# Patient Record
Sex: FEMALE | Race: WHITE | NOT HISPANIC OR LATINO | Employment: FULL TIME | URBAN - NONMETROPOLITAN AREA
[De-identification: names, ages, dates, MRNs, and addresses within clinical notes are randomized per-mention and may not be internally consistent; named-entity substitution may affect disease eponyms.]

---

## 2019-09-17 ENCOUNTER — CONSULT (OUTPATIENT)
Dept: ONCOLOGY | Facility: CLINIC | Age: 49
End: 2019-09-17

## 2019-09-17 VITALS
DIASTOLIC BLOOD PRESSURE: 82 MMHG | RESPIRATION RATE: 18 BRPM | HEART RATE: 88 BPM | HEIGHT: 61 IN | SYSTOLIC BLOOD PRESSURE: 125 MMHG | OXYGEN SATURATION: 98 % | TEMPERATURE: 97.6 F | BODY MASS INDEX: 46.24 KG/M2 | WEIGHT: 244.9 LBS

## 2019-09-17 DIAGNOSIS — D64.9 ANEMIA, UNSPECIFIED TYPE: Primary | ICD-10-CM

## 2019-09-17 DIAGNOSIS — K90.9 MALABSORPTION OF IRON: ICD-10-CM

## 2019-09-17 DIAGNOSIS — D75.839 THROMBOCYTOSIS: ICD-10-CM

## 2019-09-17 DIAGNOSIS — E53.8 B12 DEFICIENCY: ICD-10-CM

## 2019-09-17 DIAGNOSIS — D50.9 IRON DEFICIENCY ANEMIA, UNSPECIFIED IRON DEFICIENCY ANEMIA TYPE: ICD-10-CM

## 2019-09-17 LAB
ALBUMIN SERPL-MCNC: 4.21 G/DL (ref 3.5–5.2)
ALBUMIN/GLOB SERPL: 1.4 G/DL
ALP SERPL-CCNC: 63 U/L (ref 39–117)
ALT SERPL W P-5'-P-CCNC: 17 U/L (ref 1–33)
ANION GAP SERPL CALCULATED.3IONS-SCNC: 14 MMOL/L (ref 5–15)
ANISOCYTOSIS BLD QL: NORMAL
AST SERPL-CCNC: 20 U/L (ref 1–32)
BASOPHILS # BLD AUTO: 0.04 10*3/MM3 (ref 0–0.2)
BASOPHILS NFR BLD AUTO: 0.4 % (ref 0–1.5)
BILIRUB SERPL-MCNC: 0.3 MG/DL (ref 0.2–1.2)
BUN BLD-MCNC: 11 MG/DL (ref 6–20)
BUN/CREAT SERPL: 20.8 (ref 7–25)
CALCIUM SPEC-SCNC: 9.3 MG/DL (ref 8.6–10.5)
CHLORIDE SERPL-SCNC: 103 MMOL/L (ref 98–107)
CO2 SERPL-SCNC: 24 MMOL/L (ref 22–29)
CREAT BLD-MCNC: 0.53 MG/DL (ref 0.57–1)
CRP SERPL-MCNC: 0.11 MG/DL (ref 0–0.5)
DEPRECATED RDW RBC AUTO: 50.7 FL (ref 37–54)
EOSINOPHIL # BLD AUTO: 0.02 10*3/MM3 (ref 0–0.4)
EOSINOPHIL NFR BLD AUTO: 0.2 % (ref 0.3–6.2)
ERYTHROCYTE [DISTWIDTH] IN BLOOD BY AUTOMATED COUNT: 19.3 % (ref 12.3–15.4)
ERYTHROCYTE [SEDIMENTATION RATE] IN BLOOD: 22 MM/HR (ref 0–20)
FERRITIN SERPL-MCNC: 3.92 NG/ML (ref 13–150)
FOLATE SERPL-MCNC: 17.1 NG/ML (ref 4.78–24.2)
GFR SERPL CREATININE-BSD FRML MDRD: 124 ML/MIN/1.73
GLOBULIN UR ELPH-MCNC: 3.1 GM/DL
GLUCOSE BLD-MCNC: 80 MG/DL (ref 65–99)
HAPTOGLOB SERPL-MCNC: 177 MG/DL (ref 30–200)
HCT VFR BLD AUTO: 31 % (ref 34–46.6)
HGB BLD-MCNC: 9.3 G/DL (ref 12–15.9)
HYPOCHROMIA BLD QL: NORMAL
IMM GRANULOCYTES # BLD AUTO: 0.04 10*3/MM3 (ref 0–0.05)
IMM GRANULOCYTES NFR BLD AUTO: 0.4 % (ref 0–0.5)
IRON 24H UR-MRATE: 13 MCG/DL (ref 37–145)
IRON SATN MFR SERPL: 3 % (ref 20–50)
LDH SERPL-CCNC: 162 U/L (ref 135–214)
LYMPHOCYTES # BLD AUTO: 2.27 10*3/MM3 (ref 0.7–3.1)
LYMPHOCYTES NFR BLD AUTO: 25.4 % (ref 19.6–45.3)
MCH RBC QN AUTO: 21.4 PG (ref 26.6–33)
MCHC RBC AUTO-ENTMCNC: 30 G/DL (ref 31.5–35.7)
MCV RBC AUTO: 71.4 FL (ref 79–97)
MICROCYTES BLD QL: NORMAL
MONOCYTES # BLD AUTO: 0.88 10*3/MM3 (ref 0.1–0.9)
MONOCYTES NFR BLD AUTO: 9.9 % (ref 5–12)
NEUTROPHILS # BLD AUTO: 5.68 10*3/MM3 (ref 1.7–7)
NEUTROPHILS NFR BLD AUTO: 63.7 % (ref 42.7–76)
OVALOCYTES BLD QL SMEAR: NORMAL
PLATELET # BLD AUTO: 470 10*3/MM3 (ref 140–450)
PMV BLD AUTO: 9.3 FL (ref 6–12)
POTASSIUM BLD-SCNC: 3.9 MMOL/L (ref 3.5–5.2)
PROT SERPL-MCNC: 7.3 G/DL (ref 6–8.5)
RBC # BLD AUTO: 4.34 10*6/MM3 (ref 3.77–5.28)
RETICS # AUTO: 0.05 10*6/MM3 (ref 0.02–0.13)
RETICS/RBC NFR AUTO: 1.19 % (ref 0.7–1.9)
SMALL PLATELETS BLD QL SMEAR: NORMAL
SODIUM BLD-SCNC: 141 MMOL/L (ref 136–145)
TIBC SERPL-MCNC: 468 MCG/DL (ref 298–536)
TRANSFERRIN SERPL-MCNC: 314 MG/DL (ref 200–360)
TSH SERPL DL<=0.05 MIU/L-ACNC: 1.66 UIU/ML (ref 0.27–4.2)
VIT B12 BLD-MCNC: 253 PG/ML (ref 211–946)
WBC NRBC COR # BLD: 8.93 10*3/MM3 (ref 3.4–10.8)

## 2019-09-17 PROCEDURE — 85045 AUTOMATED RETICULOCYTE COUNT: CPT | Performed by: NURSE PRACTITIONER

## 2019-09-17 PROCEDURE — 85007 BL SMEAR W/DIFF WBC COUNT: CPT | Performed by: NURSE PRACTITIONER

## 2019-09-17 PROCEDURE — 85652 RBC SED RATE AUTOMATED: CPT | Performed by: NURSE PRACTITIONER

## 2019-09-17 PROCEDURE — 82525 ASSAY OF COPPER: CPT | Performed by: NURSE PRACTITIONER

## 2019-09-17 PROCEDURE — 85025 COMPLETE CBC W/AUTO DIFF WBC: CPT | Performed by: NURSE PRACTITIONER

## 2019-09-17 PROCEDURE — 82728 ASSAY OF FERRITIN: CPT | Performed by: NURSE PRACTITIONER

## 2019-09-17 PROCEDURE — 84443 ASSAY THYROID STIM HORMONE: CPT | Performed by: NURSE PRACTITIONER

## 2019-09-17 PROCEDURE — 84630 ASSAY OF ZINC: CPT | Performed by: NURSE PRACTITIONER

## 2019-09-17 PROCEDURE — 36415 COLL VENOUS BLD VENIPUNCTURE: CPT | Performed by: NURSE PRACTITIONER

## 2019-09-17 PROCEDURE — 83010 ASSAY OF HAPTOGLOBIN QUANT: CPT | Performed by: NURSE PRACTITIONER

## 2019-09-17 PROCEDURE — 83540 ASSAY OF IRON: CPT | Performed by: NURSE PRACTITIONER

## 2019-09-17 PROCEDURE — 82607 VITAMIN B-12: CPT | Performed by: NURSE PRACTITIONER

## 2019-09-17 PROCEDURE — 80053 COMPREHEN METABOLIC PANEL: CPT | Performed by: NURSE PRACTITIONER

## 2019-09-17 PROCEDURE — 82746 ASSAY OF FOLIC ACID SERUM: CPT | Performed by: NURSE PRACTITIONER

## 2019-09-17 PROCEDURE — 99204 OFFICE O/P NEW MOD 45 MIN: CPT | Performed by: NURSE PRACTITIONER

## 2019-09-17 PROCEDURE — 86140 C-REACTIVE PROTEIN: CPT | Performed by: NURSE PRACTITIONER

## 2019-09-17 PROCEDURE — 85060 BLOOD SMEAR INTERPRETATION: CPT | Performed by: NURSE PRACTITIONER

## 2019-09-17 PROCEDURE — 84466 ASSAY OF TRANSFERRIN: CPT | Performed by: NURSE PRACTITIONER

## 2019-09-17 PROCEDURE — 83615 LACTATE (LD) (LDH) ENZYME: CPT | Performed by: NURSE PRACTITIONER

## 2019-09-17 RX ORDER — LISINOPRIL 10 MG/1
10 TABLET ORAL DAILY
COMMUNITY
End: 2020-08-20

## 2019-09-17 RX ORDER — SODIUM CHLORIDE 9 MG/ML
250 INJECTION, SOLUTION INTRAVENOUS ONCE
Status: CANCELLED | OUTPATIENT
Start: 2019-09-30

## 2019-09-17 RX ORDER — LORATADINE 10 MG/1
10 TABLET ORAL DAILY
COMMUNITY

## 2019-09-17 RX ORDER — SODIUM CHLORIDE 9 MG/ML
250 INJECTION, SOLUTION INTRAVENOUS ONCE
Status: CANCELLED | OUTPATIENT
Start: 2019-09-23

## 2019-09-17 NOTE — PROGRESS NOTES
DATE OF CONSULTATION:  9/17/2019    REASON FOR REFERRAL: NOLAN    REFERRING PHYSICIAN:  ALINE Vora    CHIEF COMPLAINT:  Fatigue, weakness, shortness of breath on exertion    HISTORY OF PRESENT ILLNESS:   Kadi Awad is a very pleasant 49 y.o. female who is being seen today at the request of ALINE Vora for evaluation and treatment of NOLAN. Ms. Awad reports following with her PCP as needed and typically has blood testing done every 6 months to a year. She reports she has been struggling with NOLAN over the past year. Otherwise, she was not aware of this before as she doesn't follow up routinely. She says she feels exhausted all the time. She also has shortness of breath on exertion. She previously tried taking oral iron therapy but she says this causes significant nausea and she is unable to tolerate it. At present, she is not taking any oral iron. She says she did receive one IV iron infusion at the end of July per PCP. She denies having menorrhagia. Denies melena, rectal bleeding or hematuria. She has never had a screening colonoscopy. She denies any other complaints today.     PAST MEDICAL HISTORY:  Past Medical History:   Diagnosis Date   • Hypertension        PAST SURGICAL HISTORY:  Past Surgical History:   Procedure Laterality Date   • SPINE SURGERY      steel froy for scoliosis       FAMILY HISTORY:  History reviewed. No pertinent family history.    SOCIAL HISTORY:  Social History     Socioeconomic History   • Marital status: Single     Spouse name: Not on file   • Number of children: Not on file   • Years of education: Not on file   • Highest education level: Not on file   Tobacco Use   • Smoking status: Never Smoker   • Smokeless tobacco: Never Used   Substance and Sexual Activity   • Alcohol use: No     Frequency: Never   • Drug use: No   • Sexual activity: Defer     MEDICATIONS:  The current medication list was reviewed in the EMR    Current Outpatient Medications:   •  lisinopril  "(PRINIVIL,ZESTRIL) 10 MG tablet, Take 10 mg by mouth Daily., Disp: , Rfl:   •  loratadine (ALLERGY) 10 MG tablet, Take 10 mg by mouth Daily., Disp: , Rfl:     ALLERGIES:  No Known Allergies      REVIEW OF SYSTEMS:    A comprehensive 14 point review of systems was performed.  Significant findings as mentioned above.  All other systems reviewed and are negative.      Physical Exam   Vital Signs: /82   Pulse 88   Temp 97.6 °F (36.4 °C) (Oral)   Resp 18   Ht 154.9 cm (61\")   Wt 111 kg (244 lb 14.4 oz)   SpO2 98%   BMI 46.27 kg/m²    General: Well developed, well nourished, alert and oriented x 3, in no acute distress. Appears fatigued. Pale.   Head: ATNC   Eyes: PERRL, No evidence of conjunctivitis.   Nose: No nasal discharge.   Mouth: Oral mucosal membranes moist. No oral ulceration or hemorrhages.   Neck: Neck supple. No thyromegaly. No JVD.   Lungs: Clear in all fields to A&P without rales, rhonchi or wheezing.   Heart: S1, S2. Regular rate and rhythm. No murmurs, rubs, or gallops.   Abdomen: Soft. Bowel sounds are normoactive. Nontender with palpation.    Extremities: No cyanosis or edema. Peripheral pulses palpable and equal bilaterally.   Integumentary: No rash, sores, erythema or nodules. No blistering, bruising, or dry skin.   Hem/Lymph Nodes: No palpable cervical, submandibular, supraclavicular, axillary  lymphadenopathy noted. No petechiae, purpura or ecchymosis noted.   Neurologic: Grossly non-focal exam    Pain Score:  Pain Score    09/17/19 1429   PainSc: 0-No pain       PHQ-Score Total:  PHQ-9 Total Score: 5    RECENT LABS:  Lab Results   Component Value Date    WBC 8.93 09/17/2019    HGB 9.3 (L) 09/17/2019    HCT 31.0 (L) 09/17/2019    MCV 71.4 (L) 09/17/2019    RDW 19.3 (H) 09/17/2019     (H) 09/17/2019    NEUTRORELPCT 63.7 09/17/2019    LYMPHORELPCT 25.4 09/17/2019    MONORELPCT 9.9 09/17/2019    EOSRELPCT 0.2 (L) 09/17/2019    BASORELPCT 0.4 09/17/2019    NEUTROABS 5.68 09/17/2019 "    LYMPHSABS 2.27 09/17/2019       Lab Results   Component Value Date     09/17/2019    K 3.9 09/17/2019    CO2 24.0 09/17/2019     09/17/2019    BUN 11 09/17/2019    CREATININE 0.53 (L) 09/17/2019    EGFRIFNONA 124 09/17/2019    GLUCOSE 80 09/17/2019    CALCIUM 9.3 09/17/2019    ALKPHOS 63 09/17/2019    AST 20 09/17/2019    ALT 17 09/17/2019    BILITOT 0.3 09/17/2019    ALBUMIN 4.21 09/17/2019    PROTEINTOT 7.3 09/17/2019      Lab Results   Component Value Date    FERRITIN 3.92 (L) 09/17/2019    IRON 13 (L) 09/17/2019    TIBC 468 09/17/2019    LABIRON 3 (L) 09/17/2019    RETICCTPCT 1.19 09/17/2019    RETIC 0.0516 09/17/2019       ASSESSMENT & PLAN:  Kadi Awad is a very pleasant 49 y.o. female with    1.  Iron deficiency anemia:  -Ms. Awad typically follows with PCP only as needed with blood testing every 6 months to a year. She reports she has been struggling with NOLAN over the past year.  Previous available labs reviewed.   -Unfortunately, she is unable to tolerate oral iron due to significant GI SE.  She denies having melena, rectal bleeding or hematuria. Denies menorrhagia. She has never had a screening colonoscopy.   -Obtained repeat CBC today which showed stable Hg 9.3, platelets were elevated (likely reactive to NOLAN). Repeat iron studies remain very low. Therefore, will replace with IV Feraheme pending insurance approval.   -Also checked additional labs to further evaluate anemia including PBS, Retic, LDH, Haptoglobin, B12, Folate, TSH, ESR, CRP, Copper and Zinc which are currently pending.   -Will plan to follow up in 2 months with repeat labs. In the meantime, recommended GI evaluation and patient was agreeable. Will place referral today.     2. B12 deficiency:  -B12 of 205 noted on lab testing with PCP in July. Advised patient to take SL B12 2500 mcg daily. Will recheck B12 today and with next follow up.     3. Thrombocytosis:  -Likely reactive and secondary to NOLAN. Will monitor and this  should normalize once iron is replaced.     ACO / ANDRE/Other  Quality measures  - Recommended flu vaccine.   - Kadi Awad reports a pain score of 0.    - Current outpatient and discharge medications have been reconciled for the patient.  Reviewed by: LEILANI Hobson    The patient was in agreement with the plan and all questions were answered to her satisfaction.     Thank you so much for allowing us to participate in the care of Kadi Awad . Please do not hesitate to contact us with any questions or concerns.     A total of 45 minutes were spent coordinating this patient’s care in clinic today; more than 50% of this time was face-to-face with the patient, reviewing her medical history and counseling on the current treatment and followup plan. All questions were answered to her satisfaction.      Electronically Signed by: LEILANI Hobson , September 17, 2019 2:49 PM       CC:   ALINE Vora Sarah Beth, PA

## 2019-09-19 LAB
COPPER SERPL-MCNC: 132 UG/DL (ref 72–166)
ZINC SERPL-MCNC: 59 UG/DL (ref 56–134)

## 2019-09-20 LAB
CYTOLOGIST CVX/VAG CYTO: NORMAL
PATH INTERP BLD-IMP: NORMAL

## 2019-09-23 ENCOUNTER — INFUSION (OUTPATIENT)
Dept: ONCOLOGY | Facility: HOSPITAL | Age: 49
End: 2019-09-23

## 2019-09-23 VITALS
BODY MASS INDEX: 46.78 KG/M2 | SYSTOLIC BLOOD PRESSURE: 126 MMHG | DIASTOLIC BLOOD PRESSURE: 81 MMHG | OXYGEN SATURATION: 97 % | HEART RATE: 96 BPM | WEIGHT: 247.6 LBS | RESPIRATION RATE: 18 BRPM | TEMPERATURE: 98.5 F

## 2019-09-23 DIAGNOSIS — K90.9 MALABSORPTION OF IRON: ICD-10-CM

## 2019-09-23 DIAGNOSIS — D50.9 IRON DEFICIENCY ANEMIA, UNSPECIFIED IRON DEFICIENCY ANEMIA TYPE: Primary | ICD-10-CM

## 2019-09-23 PROCEDURE — 25010000002 FERUMOXYTOL 510 MG/17ML SOLUTION 510 MG VIAL: Performed by: NURSE PRACTITIONER

## 2019-09-23 PROCEDURE — 96374 THER/PROPH/DIAG INJ IV PUSH: CPT

## 2019-09-23 RX ORDER — SODIUM CHLORIDE 9 MG/ML
250 INJECTION, SOLUTION INTRAVENOUS ONCE
Status: COMPLETED | OUTPATIENT
Start: 2019-09-23 | End: 2019-09-23

## 2019-09-23 RX ADMIN — SODIUM CHLORIDE 250 ML: 9 INJECTION, SOLUTION INTRAVENOUS at 10:21

## 2019-09-23 RX ADMIN — FERUMOXYTOL 510 MG: 510 INJECTION INTRAVENOUS at 10:21

## 2019-09-30 ENCOUNTER — INFUSION (OUTPATIENT)
Dept: ONCOLOGY | Facility: HOSPITAL | Age: 49
End: 2019-09-30

## 2019-09-30 VITALS
BODY MASS INDEX: 46.61 KG/M2 | WEIGHT: 246.7 LBS | OXYGEN SATURATION: 97 % | HEART RATE: 83 BPM | RESPIRATION RATE: 18 BRPM | SYSTOLIC BLOOD PRESSURE: 119 MMHG | DIASTOLIC BLOOD PRESSURE: 81 MMHG | TEMPERATURE: 97.6 F

## 2019-09-30 DIAGNOSIS — K90.9 MALABSORPTION OF IRON: ICD-10-CM

## 2019-09-30 DIAGNOSIS — D50.9 IRON DEFICIENCY ANEMIA, UNSPECIFIED IRON DEFICIENCY ANEMIA TYPE: Primary | ICD-10-CM

## 2019-09-30 PROCEDURE — 96374 THER/PROPH/DIAG INJ IV PUSH: CPT

## 2019-09-30 PROCEDURE — 25010000002 FERUMOXYTOL 510 MG/17ML SOLUTION 510 MG VIAL: Performed by: NURSE PRACTITIONER

## 2019-09-30 RX ORDER — SODIUM CHLORIDE 9 MG/ML
250 INJECTION, SOLUTION INTRAVENOUS ONCE
Status: COMPLETED | OUTPATIENT
Start: 2019-09-30 | End: 2019-09-30

## 2019-09-30 RX ADMIN — SODIUM CHLORIDE 250 ML: 9 INJECTION, SOLUTION INTRAVENOUS at 10:05

## 2019-09-30 RX ADMIN — FERUMOXYTOL 510 MG: 510 INJECTION INTRAVENOUS at 10:05

## 2019-11-19 ENCOUNTER — LAB (OUTPATIENT)
Dept: ONCOLOGY | Facility: CLINIC | Age: 49
End: 2019-11-19

## 2019-11-19 ENCOUNTER — OFFICE VISIT (OUTPATIENT)
Dept: ONCOLOGY | Facility: CLINIC | Age: 49
End: 2019-11-19

## 2019-11-19 VITALS
BODY MASS INDEX: 47.24 KG/M2 | SYSTOLIC BLOOD PRESSURE: 125 MMHG | HEART RATE: 103 BPM | TEMPERATURE: 98 F | WEIGHT: 250 LBS | RESPIRATION RATE: 20 BRPM | OXYGEN SATURATION: 98 % | DIASTOLIC BLOOD PRESSURE: 85 MMHG

## 2019-11-19 DIAGNOSIS — E53.8 B12 DEFICIENCY: ICD-10-CM

## 2019-11-19 DIAGNOSIS — D75.839 THROMBOCYTOSIS: ICD-10-CM

## 2019-11-19 DIAGNOSIS — K90.9 MALABSORPTION OF IRON: ICD-10-CM

## 2019-11-19 DIAGNOSIS — D50.9 IRON DEFICIENCY ANEMIA, UNSPECIFIED IRON DEFICIENCY ANEMIA TYPE: ICD-10-CM

## 2019-11-19 DIAGNOSIS — D50.9 IRON DEFICIENCY ANEMIA, UNSPECIFIED IRON DEFICIENCY ANEMIA TYPE: Primary | ICD-10-CM

## 2019-11-19 LAB
ALBUMIN SERPL-MCNC: 3.8 G/DL (ref 3.5–5.2)
ALBUMIN/GLOB SERPL: 1.2 G/DL
ALP SERPL-CCNC: 74 U/L (ref 39–117)
ALT SERPL W P-5'-P-CCNC: 18 U/L (ref 1–33)
ANION GAP SERPL CALCULATED.3IONS-SCNC: 9.6 MMOL/L (ref 5–15)
ANISOCYTOSIS BLD QL: NORMAL
AST SERPL-CCNC: 22 U/L (ref 1–32)
BASOPHILS # BLD AUTO: 0.05 10*3/MM3 (ref 0–0.2)
BASOPHILS NFR BLD AUTO: 0.8 % (ref 0–1.5)
BILIRUB SERPL-MCNC: 0.2 MG/DL (ref 0.2–1.2)
BUN BLD-MCNC: 8 MG/DL (ref 6–20)
BUN/CREAT SERPL: 14.5 (ref 7–25)
CALCIUM SPEC-SCNC: 8.7 MG/DL (ref 8.6–10.5)
CHLORIDE SERPL-SCNC: 108 MMOL/L (ref 98–107)
CO2 SERPL-SCNC: 24.4 MMOL/L (ref 22–29)
CREAT BLD-MCNC: 0.55 MG/DL (ref 0.57–1)
DEPRECATED RDW RBC AUTO: 68.2 FL (ref 37–54)
EOSINOPHIL # BLD AUTO: 0.02 10*3/MM3 (ref 0–0.4)
EOSINOPHIL NFR BLD AUTO: 0.3 % (ref 0.3–6.2)
ERYTHROCYTE [DISTWIDTH] IN BLOOD BY AUTOMATED COUNT: 22.5 % (ref 12.3–15.4)
FERRITIN SERPL-MCNC: 15.67 NG/ML (ref 13–150)
GFR SERPL CREATININE-BSD FRML MDRD: 118 ML/MIN/1.73
GLOBULIN UR ELPH-MCNC: 3.1 GM/DL
GLUCOSE BLD-MCNC: 66 MG/DL (ref 65–99)
HCT VFR BLD AUTO: 40.1 % (ref 34–46.6)
HGB BLD-MCNC: 12.5 G/DL (ref 12–15.9)
IMM GRANULOCYTES # BLD AUTO: 0.03 10*3/MM3 (ref 0–0.05)
IMM GRANULOCYTES NFR BLD AUTO: 0.5 % (ref 0–0.5)
IRON 24H UR-MRATE: 40 MCG/DL (ref 37–145)
IRON SATN MFR SERPL: 11 % (ref 20–50)
LYMPHOCYTES # BLD AUTO: 1.42 10*3/MM3 (ref 0.7–3.1)
LYMPHOCYTES NFR BLD AUTO: 23.5 % (ref 19.6–45.3)
MCH RBC QN AUTO: 26.8 PG (ref 26.6–33)
MCHC RBC AUTO-ENTMCNC: 31.2 G/DL (ref 31.5–35.7)
MCV RBC AUTO: 86.1 FL (ref 79–97)
MONOCYTES # BLD AUTO: 0.51 10*3/MM3 (ref 0.1–0.9)
MONOCYTES NFR BLD AUTO: 8.5 % (ref 5–12)
NEUTROPHILS # BLD AUTO: 4 10*3/MM3 (ref 1.7–7)
NEUTROPHILS NFR BLD AUTO: 66.4 % (ref 42.7–76)
NRBC BLD AUTO-RTO: 0 /100 WBC (ref 0–0.2)
PLAT MORPH BLD: NORMAL
PLATELET # BLD AUTO: 320 10*3/MM3 (ref 140–450)
PMV BLD AUTO: 10 FL (ref 6–12)
POIKILOCYTOSIS BLD QL SMEAR: NORMAL
POTASSIUM BLD-SCNC: 3.5 MMOL/L (ref 3.5–5.2)
PROT SERPL-MCNC: 6.9 G/DL (ref 6–8.5)
RBC # BLD AUTO: 4.66 10*6/MM3 (ref 3.77–5.28)
SODIUM BLD-SCNC: 142 MMOL/L (ref 136–145)
TIBC SERPL-MCNC: 362 MCG/DL (ref 298–536)
TRANSFERRIN SERPL-MCNC: 243 MG/DL (ref 200–360)
WBC NRBC COR # BLD: 6.03 10*3/MM3 (ref 3.4–10.8)

## 2019-11-19 PROCEDURE — 83540 ASSAY OF IRON: CPT | Performed by: NURSE PRACTITIONER

## 2019-11-19 PROCEDURE — 85025 COMPLETE CBC W/AUTO DIFF WBC: CPT | Performed by: NURSE PRACTITIONER

## 2019-11-19 PROCEDURE — 99214 OFFICE O/P EST MOD 30 MIN: CPT | Performed by: NURSE PRACTITIONER

## 2019-11-19 PROCEDURE — 84466 ASSAY OF TRANSFERRIN: CPT | Performed by: NURSE PRACTITIONER

## 2019-11-19 PROCEDURE — 82607 VITAMIN B-12: CPT | Performed by: NURSE PRACTITIONER

## 2019-11-19 PROCEDURE — 85007 BL SMEAR W/DIFF WBC COUNT: CPT | Performed by: NURSE PRACTITIONER

## 2019-11-19 PROCEDURE — 80053 COMPREHEN METABOLIC PANEL: CPT | Performed by: NURSE PRACTITIONER

## 2019-11-19 PROCEDURE — 82728 ASSAY OF FERRITIN: CPT | Performed by: NURSE PRACTITIONER

## 2019-11-19 NOTE — PROGRESS NOTES
DATE:  11/19/2019    REASON FOR FOLLOW UP: NOLAN    REFERRING PHYSICIAN:  ALINE Vora    CHIEF COMPLAINT:  Follow up of NOLAN    TREATMENT:  1. Oral iron-unable to tolerate due to significant GI SE    2. IV Feraheme      3. SL B12 replacement-started mid-September 2019    HISTORY OF PRESENT ILLNESS:   Kadi Awad is a very pleasant 49 y.o. female who is being seen today at the request of ALINE Vora for evaluation and treatment of NOLAN. Ms. Awad reports following with her PCP as needed and typically has blood testing done every 6 months to a year. She reports she has been struggling with NOLAN over the past year. Otherwise, she was not aware of this before as she doesn't follow up routinely. She says she feels exhausted all the time. She also has shortness of breath on exertion. She previously tried taking oral iron therapy but she says this causes significant nausea and she is unable to tolerate it. At present, she is not taking any oral iron. She says she did receive one IV iron infusion at the end of July per PCP. She denies having menorrhagia. Denies melena, rectal bleeding or hematuria. She has never had a screening colonoscopy. She denies any other complaints today.     INTERVAL HISTORY:  Ms. Awad presents today for follow up of NOLAN. Since her last visit, she reports she has been feeling much better with improvement in fatigue and shortness of breath on exertion. She was replaced with IV Feraheme which she completed on 9/30. She was also referred to GI and reports having EGD which was negative for bleeding. However, she says she has a hiatal hernia and they are planning to start her on a PPI. She was also positive for H. Pylori and completed a course of antibiotics. She is scheduled for colonoscopy in January. She again denies any melena, rectal bleeding, hematuria or menorrhagia. She has been taking SL B12 as advised. She denies any other complaints today.     PAST MEDICAL HISTORY:  Past  Medical History:   Diagnosis Date   • Hypertension        PAST SURGICAL HISTORY:  Past Surgical History:   Procedure Laterality Date   • SPINE SURGERY      steel froy for scoliosis       FAMILY HISTORY:  No family history on file.    SOCIAL HISTORY:  Social History     Socioeconomic History   • Marital status: Single     Spouse name: Not on file   • Number of children: Not on file   • Years of education: Not on file   • Highest education level: Not on file   Tobacco Use   • Smoking status: Never Smoker   • Smokeless tobacco: Never Used   Substance and Sexual Activity   • Alcohol use: No     Frequency: Never   • Drug use: No   • Sexual activity: Defer     MEDICATIONS:  The current medication list was reviewed in the EMR    Current Outpatient Medications:   •  lisinopril (PRINIVIL,ZESTRIL) 10 MG tablet, Take 10 mg by mouth Daily., Disp: , Rfl:   •  loratadine (ALLERGY) 10 MG tablet, Take 10 mg by mouth Daily., Disp: , Rfl:     ALLERGIES:  No Known Allergies    REVIEW OF SYSTEMS:    A comprehensive 14 point review of systems was performed.  Significant findings as mentioned above.  All other systems reviewed and are negative.      Physical Exam   Vital Signs: /85   Pulse 103   Temp 98 °F (36.7 °C) (Oral)   Resp 20   Wt 113 kg (250 lb)   SpO2 98%   BMI 47.24 kg/m²    General: Well developed, well nourished, alert and oriented x 3, in no acute distress. Appears well, in good spirits.   Head: ATNC   Eyes: PERRL, No evidence of conjunctivitis.   Nose: No nasal discharge.   Mouth: Oral mucosal membranes moist. No oral ulceration or hemorrhages.   Neck: Neck supple. No thyromegaly. No JVD.   Lungs: Clear in all fields to A&P without rales, rhonchi or wheezing.   Heart: S1, S2.No murmurs, rubs, or gallops.   Abdomen: Soft. Bowel sounds are normoactive. Nontender with palpation.    Extremities: No cyanosis or edema. Peripheral pulses palpable and equal bilaterally.   Integumentary: No rash, sores, erythema or  nodules. No blistering, bruising, or dry skin.   Hem/Lymph Nodes: No palpable cervical, submandibular, supraclavicular, axillary  lymphadenopathy noted. No petechiae, purpura or ecchymosis noted.   Neurologic: Grossly non-focal exam    Pain Score:  Pain Score    11/19/19 1236   PainSc: 0-No pain     RECENT LABS:  Lab Results   Component Value Date    WBC 6.03 11/19/2019    HGB 12.5 11/19/2019    HCT 40.1 11/19/2019    MCV 86.1 11/19/2019    RDW 22.5 (H) 11/19/2019     11/19/2019    NEUTRORELPCT 66.4 11/19/2019    LYMPHORELPCT 23.5 11/19/2019    MONORELPCT 8.5 11/19/2019    EOSRELPCT 0.3 11/19/2019    BASORELPCT 0.8 11/19/2019    NEUTROABS 4.00 11/19/2019    LYMPHSABS 1.42 11/19/2019       Lab Results   Component Value Date     11/19/2019    K 3.5 11/19/2019    CO2 24.4 11/19/2019     (H) 11/19/2019    BUN 8 11/19/2019    CREATININE 0.55 (L) 11/19/2019    EGFRIFNONA 118 11/19/2019    GLUCOSE 66 11/19/2019    CALCIUM 8.7 11/19/2019    ALKPHOS 74 11/19/2019    AST 22 11/19/2019    ALT 18 11/19/2019    BILITOT 0.2 11/19/2019    ALBUMIN 3.80 11/19/2019    PROTEINTOT 6.9 11/19/2019      Lab Results   Component Value Date    FERRITIN 15.67 11/19/2019    IRON 40 11/19/2019    TIBC 362 11/19/2019    LABIRON 11 (L) 11/19/2019    DCUBCXDY45 253 09/17/2019    FOLATE 17.10 09/17/2019    HAPTOGLOBIN 177 09/17/2019    RETICCTPCT 1.19 09/17/2019    RETIC 0.0516 09/17/2019     Workup 9/17/19    Iron  37 - 145 mcg/dL 13 Abnormally low     Iron Saturation  20 - 50 % 3 Abnormally low     Transferrin  200 - 360 mg/dL 314    TIBC  298 - 536 mcg/dL 468      Ferritin  13.00 - 150.00 ng/mL 3.92 Abnormally low       Vitamin B-12  211 - 946 pg/mL 253      Folate  4.78 - 24.20 ng/mL 17.10      Reticulocyte %  0.70 - 1.90 % 1.19    Reticulocyte Absolute  0.0200 - 0.1300 10*6/mm3 0.0516      LDH  135 - 214 U/L 162      Haptoglobin  30 - 200 mg/dL 177      TSH  0.270 - 4.200 uIU/mL 1.660      Sed Rate  0 - 20 mm/hr 22  Abnormally high       C-Reactive Protein  0.00 - 0.50 mg/dL 0.11      Copper  72 - 166 ug/dL 132      Zinc  56 - 134 ug/dL 59        ASSESSMENT & PLAN:  Kadi Awad is a very pleasant 49 y.o. female with    1.  Iron deficiency anemia:  -Ms. Awad typically follows with PCP only as needed with blood testing every 6 months to a year. She reports she has been struggling with NOLAN over the past year.  Previous available labs reviewed.   -Unfortunately, she is unable to tolerate oral iron due to significant GI SE.  She denies having melena, rectal bleeding or hematuria. Denies menorrhagia. She has never had a screening colonoscopy.   -Obtained repeat CBC on initial consultation which showed stable Hg 9.3, platelets were elevated (likely reactive to NOLAN). Repeat iron studies remained very low. Therefore, replaced with IV Feraheme which she completed on 9/30.   -She was also referred to GI and reports having EGD which was negative for bleeding. However, she says she has a hiatal hernia and they are planning to start her on a PPI. She was also positive for H. Pylori and completed a course of antibiotics. She is scheduled for colonoscopy in January. Will request reports.   -Also checked additional labs to further evaluate anemia including PBS as above. There was no evidence of hemolysis, B12 was low therefore, started replacement as below,  Folate replete, TSH was normal, ESR was mildly elevated and suggestive of underlying inflammation, CRP was normal, Copper and Zinc were normal.   -Repeat CBC from today showing normalized Hg and iron is now replete, but borderline. Will plan to recheck CBC, Iron studies, ferritin and tissue transglutaminase in 6 weeks. Will replace with IV Feraheme if needed.   -Will plan to follow up in 3 months with labs. In the meantime, she will follow up with GI as scheduled in January for colonoscopy. She will let us know if she were to have any new/worsening symptoms and we would be happy to see  her sooner and check labs.     2. B12 deficiency:  -B12 of 205 noted on lab testing with PCP in July. Advised patient to take SL B12 2500 mcg daily which she has been taking. Repeat B12 from today is pending. Will monitor.     3. Thrombocytosis:  -Likely reactive and secondary to NOLAN. Platelet count has now normalized following replacement with IV iron. Will monitor.     ACO / ANDRE/Other  Quality measures  -  Kadi Awad received 2019 flu vaccine.  -  Kadi Awad reports a pain score of 0.    -  Current outpatient and discharge medications have been reconciled for the patient.  Reviewed by: LEILANI Hobson      The patient was in agreement with the plan and all questions were answered to her satisfaction.     Thank you so much for allowing us to participate in the care of Kadi Awad . Please do not hesitate to contact us with any questions or concerns.     A total of 25 minutes were spent coordinating this patient’s care in clinic today; more than 50% of this time was face-to-face with the patient, reviewing her medical history and counseling on the current treatment and followup plan. All questions were answered to her satisfaction.      Electronically Signed by: LEILANI Hobson , November 19, 2019 10:04 AM       CC:   Melodie Mejias PA

## 2019-11-20 LAB — VIT B12 BLD-MCNC: 731 PG/ML (ref 211–946)

## 2020-01-02 ENCOUNTER — LAB (OUTPATIENT)
Dept: ONCOLOGY | Facility: CLINIC | Age: 50
End: 2020-01-02

## 2020-01-02 VITALS
TEMPERATURE: 97.8 F | SYSTOLIC BLOOD PRESSURE: 135 MMHG | DIASTOLIC BLOOD PRESSURE: 86 MMHG | BODY MASS INDEX: 47.26 KG/M2 | HEIGHT: 61 IN | OXYGEN SATURATION: 98 % | HEART RATE: 82 BPM | RESPIRATION RATE: 16 BRPM

## 2020-01-02 DIAGNOSIS — K90.9 MALABSORPTION OF IRON: ICD-10-CM

## 2020-01-02 DIAGNOSIS — D50.9 IRON DEFICIENCY ANEMIA, UNSPECIFIED IRON DEFICIENCY ANEMIA TYPE: ICD-10-CM

## 2020-01-02 LAB
ALBUMIN SERPL-MCNC: 3.95 G/DL (ref 3.5–5.2)
ALBUMIN/GLOB SERPL: 1.3 G/DL
ALP SERPL-CCNC: 72 U/L (ref 39–117)
ALT SERPL W P-5'-P-CCNC: 25 U/L (ref 1–33)
ANION GAP SERPL CALCULATED.3IONS-SCNC: 10.5 MMOL/L (ref 5–15)
AST SERPL-CCNC: 25 U/L (ref 1–32)
BASOPHILS # BLD AUTO: 0.05 10*3/MM3 (ref 0–0.2)
BASOPHILS NFR BLD AUTO: 0.8 % (ref 0–1.5)
BILIRUB SERPL-MCNC: 0.5 MG/DL (ref 0.2–1.2)
BUN BLD-MCNC: 9 MG/DL (ref 6–20)
BUN/CREAT SERPL: 16.1 (ref 7–25)
CALCIUM SPEC-SCNC: 9.1 MG/DL (ref 8.6–10.5)
CHLORIDE SERPL-SCNC: 105 MMOL/L (ref 98–107)
CO2 SERPL-SCNC: 23.5 MMOL/L (ref 22–29)
CREAT BLD-MCNC: 0.56 MG/DL (ref 0.57–1)
DEPRECATED RDW RBC AUTO: 43.9 FL (ref 37–54)
EOSINOPHIL # BLD AUTO: 0.03 10*3/MM3 (ref 0–0.4)
EOSINOPHIL NFR BLD AUTO: 0.5 % (ref 0.3–6.2)
ERYTHROCYTE [DISTWIDTH] IN BLOOD BY AUTOMATED COUNT: 14.6 % (ref 12.3–15.4)
FERRITIN SERPL-MCNC: 12.38 NG/ML (ref 13–150)
GFR SERPL CREATININE-BSD FRML MDRD: 116 ML/MIN/1.73
GLOBULIN UR ELPH-MCNC: 3.1 GM/DL
GLUCOSE BLD-MCNC: 90 MG/DL (ref 65–99)
HCT VFR BLD AUTO: 41.5 % (ref 34–46.6)
HGB BLD-MCNC: 13.2 G/DL (ref 12–15.9)
IMM GRANULOCYTES # BLD AUTO: 0.05 10*3/MM3 (ref 0–0.05)
IMM GRANULOCYTES NFR BLD AUTO: 0.8 % (ref 0–0.5)
IRON 24H UR-MRATE: 84 MCG/DL (ref 37–145)
IRON SATN MFR SERPL: 22 % (ref 20–50)
LYMPHOCYTES # BLD AUTO: 1.77 10*3/MM3 (ref 0.7–3.1)
LYMPHOCYTES NFR BLD AUTO: 27.6 % (ref 19.6–45.3)
MCH RBC QN AUTO: 28 PG (ref 26.6–33)
MCHC RBC AUTO-ENTMCNC: 31.8 G/DL (ref 31.5–35.7)
MCV RBC AUTO: 87.9 FL (ref 79–97)
MONOCYTES # BLD AUTO: 0.51 10*3/MM3 (ref 0.1–0.9)
MONOCYTES NFR BLD AUTO: 7.9 % (ref 5–12)
NEUTROPHILS # BLD AUTO: 4.01 10*3/MM3 (ref 1.7–7)
NEUTROPHILS NFR BLD AUTO: 62.4 % (ref 42.7–76)
NRBC BLD AUTO-RTO: 0 /100 WBC (ref 0–0.2)
PLATELET # BLD AUTO: 301 10*3/MM3 (ref 140–450)
PMV BLD AUTO: 10 FL (ref 6–12)
POTASSIUM BLD-SCNC: 4.4 MMOL/L (ref 3.5–5.2)
PROT SERPL-MCNC: 7 G/DL (ref 6–8.5)
RBC # BLD AUTO: 4.72 10*6/MM3 (ref 3.77–5.28)
SODIUM BLD-SCNC: 139 MMOL/L (ref 136–145)
TIBC SERPL-MCNC: 386 MCG/DL (ref 298–536)
TRANSFERRIN SERPL-MCNC: 259 MG/DL (ref 200–360)
WBC NRBC COR # BLD: 6.42 10*3/MM3 (ref 3.4–10.8)

## 2020-01-02 PROCEDURE — 84466 ASSAY OF TRANSFERRIN: CPT | Performed by: NURSE PRACTITIONER

## 2020-01-02 PROCEDURE — 83516 IMMUNOASSAY NONANTIBODY: CPT | Performed by: NURSE PRACTITIONER

## 2020-01-02 PROCEDURE — 83540 ASSAY OF IRON: CPT | Performed by: NURSE PRACTITIONER

## 2020-01-02 PROCEDURE — 80053 COMPREHEN METABOLIC PANEL: CPT | Performed by: NURSE PRACTITIONER

## 2020-01-02 PROCEDURE — 85025 COMPLETE CBC W/AUTO DIFF WBC: CPT | Performed by: NURSE PRACTITIONER

## 2020-01-02 PROCEDURE — 82728 ASSAY OF FERRITIN: CPT | Performed by: NURSE PRACTITIONER

## 2020-01-03 DIAGNOSIS — K90.9 MALABSORPTION OF IRON: ICD-10-CM

## 2020-01-03 DIAGNOSIS — D50.9 IRON DEFICIENCY ANEMIA, UNSPECIFIED IRON DEFICIENCY ANEMIA TYPE: ICD-10-CM

## 2020-01-03 LAB — TTG IGA SER-ACNC: <2 U/ML (ref 0–3)

## 2020-01-03 RX ORDER — SODIUM CHLORIDE 9 MG/ML
250 INJECTION, SOLUTION INTRAVENOUS ONCE
Status: CANCELLED | OUTPATIENT
Start: 2020-01-13

## 2020-01-03 RX ORDER — SODIUM CHLORIDE 9 MG/ML
250 INJECTION, SOLUTION INTRAVENOUS ONCE
Status: CANCELLED | OUTPATIENT
Start: 2020-01-20

## 2020-01-13 ENCOUNTER — INFUSION (OUTPATIENT)
Dept: ONCOLOGY | Facility: HOSPITAL | Age: 50
End: 2020-01-13

## 2020-01-13 VITALS
OXYGEN SATURATION: 96 % | TEMPERATURE: 97.7 F | DIASTOLIC BLOOD PRESSURE: 89 MMHG | WEIGHT: 247.3 LBS | SYSTOLIC BLOOD PRESSURE: 132 MMHG | BODY MASS INDEX: 46.75 KG/M2 | RESPIRATION RATE: 20 BRPM | HEART RATE: 88 BPM

## 2020-01-13 DIAGNOSIS — K90.9 MALABSORPTION OF IRON: ICD-10-CM

## 2020-01-13 DIAGNOSIS — D50.9 IRON DEFICIENCY ANEMIA, UNSPECIFIED IRON DEFICIENCY ANEMIA TYPE: Primary | ICD-10-CM

## 2020-01-13 PROCEDURE — 96374 THER/PROPH/DIAG INJ IV PUSH: CPT

## 2020-01-13 PROCEDURE — 25010000002 FERUMOXYTOL 510 MG/17ML SOLUTION 510 MG VIAL: Performed by: NURSE PRACTITIONER

## 2020-01-13 RX ORDER — SODIUM CHLORIDE 9 MG/ML
250 INJECTION, SOLUTION INTRAVENOUS ONCE
Status: COMPLETED | OUTPATIENT
Start: 2020-01-13 | End: 2020-01-13

## 2020-01-13 RX ADMIN — SODIUM CHLORIDE 250 ML: 9 INJECTION, SOLUTION INTRAVENOUS at 11:29

## 2020-01-13 RX ADMIN — FERUMOXYTOL 510 MG: 510 INJECTION INTRAVENOUS at 11:29

## 2020-01-20 ENCOUNTER — INFUSION (OUTPATIENT)
Dept: ONCOLOGY | Facility: HOSPITAL | Age: 50
End: 2020-01-20

## 2020-01-20 VITALS
RESPIRATION RATE: 18 BRPM | WEIGHT: 248.9 LBS | BODY MASS INDEX: 47.05 KG/M2 | TEMPERATURE: 97.4 F | DIASTOLIC BLOOD PRESSURE: 83 MMHG | SYSTOLIC BLOOD PRESSURE: 119 MMHG | OXYGEN SATURATION: 99 % | HEART RATE: 90 BPM

## 2020-01-20 DIAGNOSIS — D50.9 IRON DEFICIENCY ANEMIA, UNSPECIFIED IRON DEFICIENCY ANEMIA TYPE: Primary | ICD-10-CM

## 2020-01-20 DIAGNOSIS — K90.9 MALABSORPTION OF IRON: ICD-10-CM

## 2020-01-20 PROCEDURE — 96374 THER/PROPH/DIAG INJ IV PUSH: CPT

## 2020-01-20 PROCEDURE — 25010000002 FERUMOXYTOL 510 MG/17ML SOLUTION 510 MG VIAL: Performed by: NURSE PRACTITIONER

## 2020-01-20 RX ORDER — SODIUM CHLORIDE 9 MG/ML
250 INJECTION, SOLUTION INTRAVENOUS ONCE
Status: COMPLETED | OUTPATIENT
Start: 2020-01-20 | End: 2020-01-20

## 2020-01-20 RX ADMIN — FERUMOXYTOL 510 MG: 510 INJECTION INTRAVENOUS at 15:07

## 2020-01-20 RX ADMIN — SODIUM CHLORIDE 250 ML: 9 INJECTION, SOLUTION INTRAVENOUS at 15:05

## 2020-02-19 ENCOUNTER — OFFICE VISIT (OUTPATIENT)
Dept: ONCOLOGY | Facility: CLINIC | Age: 50
End: 2020-02-19

## 2020-02-19 VITALS
WEIGHT: 247 LBS | RESPIRATION RATE: 18 BRPM | SYSTOLIC BLOOD PRESSURE: 131 MMHG | DIASTOLIC BLOOD PRESSURE: 83 MMHG | TEMPERATURE: 97.6 F | OXYGEN SATURATION: 98 % | HEART RATE: 86 BPM | BODY MASS INDEX: 46.69 KG/M2

## 2020-02-19 DIAGNOSIS — D50.9 IRON DEFICIENCY ANEMIA, UNSPECIFIED IRON DEFICIENCY ANEMIA TYPE: Primary | ICD-10-CM

## 2020-02-19 DIAGNOSIS — E53.8 B12 DEFICIENCY: ICD-10-CM

## 2020-02-19 PROCEDURE — 83540 ASSAY OF IRON: CPT | Performed by: NURSE PRACTITIONER

## 2020-02-19 PROCEDURE — 99214 OFFICE O/P EST MOD 30 MIN: CPT | Performed by: NURSE PRACTITIONER

## 2020-02-19 PROCEDURE — 82728 ASSAY OF FERRITIN: CPT | Performed by: NURSE PRACTITIONER

## 2020-02-19 PROCEDURE — 84466 ASSAY OF TRANSFERRIN: CPT | Performed by: NURSE PRACTITIONER

## 2020-02-19 PROCEDURE — 85025 COMPLETE CBC W/AUTO DIFF WBC: CPT | Performed by: NURSE PRACTITIONER

## 2020-02-19 RX ORDER — UBIDECARENONE 75 MG
50 CAPSULE ORAL DAILY
COMMUNITY

## 2020-02-19 RX ORDER — PANTOPRAZOLE SODIUM 40 MG/1
40 TABLET, DELAYED RELEASE ORAL DAILY
COMMUNITY

## 2020-02-19 NOTE — PROGRESS NOTES
DATE:  2/19/2020    REASON FOR FOLLOW UP: NOLAN    REFERRING PHYSICIAN:  ALINE Vora    CHIEF COMPLAINT:  Follow up of NOLAN    TREATMENT:  1. Oral iron-unable to tolerate due to significant GI SE    2. IV Feraheme      3. SL B12 replacement-started mid-September 2019    HISTORY OF PRESENT ILLNESS:   Kadi Awad is a very pleasant 49 y.o. female who is being seen today at the request of ALINE Vora for evaluation and treatment of NOLAN. Ms. Awad reports following with her PCP as needed and typically has blood testing done every 6 months to a year. She reports she has been struggling with NOLAN over the past year. Otherwise, she was not aware of this before as she doesn't follow up routinely. She says she feels exhausted all the time. She also has shortness of breath on exertion. She previously tried taking oral iron therapy but she says this causes significant nausea and she is unable to tolerate it. At present, she is not taking any oral iron. She says she did receive one IV iron infusion at the end of July per PCP. She denies having menorrhagia. Denies melena, rectal bleeding or hematuria. She has never had a screening colonoscopy. She denies any other complaints today.     INTERVAL HISTORY:  Ms. Awad presents today for follow up of NOLAN. Her iron was low again in January. Therefore, she was replaced with Feraheme which she completed on 1/20/20. She has chronic fatigue which has recently improved. She again denies any melena, rectal bleeding, hematuria or menorrhagia. She followed up with GI and underwent colonoscopy on Monday. She says there was no evidence of bleeding but she had 4 polyps removed and will follow up on 3/5 for path results. She continues to take SL B12 as advised. She denies any other complaints today.     PAST MEDICAL HISTORY:  Past Medical History:   Diagnosis Date   • Hypertension        PAST SURGICAL HISTORY:  Past Surgical History:   Procedure Laterality Date   • SPINE  SURGERY      steel froy for scoliosis       FAMILY HISTORY:  History reviewed. No pertinent family history.    SOCIAL HISTORY:  Social History     Socioeconomic History   • Marital status: Single     Spouse name: Not on file   • Number of children: Not on file   • Years of education: Not on file   • Highest education level: Not on file   Tobacco Use   • Smoking status: Never Smoker   • Smokeless tobacco: Never Used   Substance and Sexual Activity   • Alcohol use: No     Frequency: Never   • Drug use: No   • Sexual activity: Defer     MEDICATIONS:  The current medication list was reviewed in the EMR    Current Outpatient Medications:   •  lisinopril (PRINIVIL,ZESTRIL) 10 MG tablet, Take 10 mg by mouth Daily., Disp: , Rfl:   •  loratadine (ALLERGY) 10 MG tablet, Take 10 mg by mouth Daily., Disp: , Rfl:   •  pantoprazole (PROTONIX) 40 MG EC tablet, Take 40 mg by mouth Daily., Disp: , Rfl:   •  vitamin B-12 (CYANOCOBALAMIN) 100 MCG tablet, Take 50 mcg by mouth Daily., Disp: , Rfl:     ALLERGIES:    Allergies   Allergen Reactions   • Minocycline-Acne Care Products Other (See Comments) and Dizziness     headaches     REVIEW OF SYSTEMS:    A comprehensive 14 point review of systems was performed.  Significant findings as mentioned above.  All other systems reviewed and are negative.      Physical Exam   Vital Signs: /83   Pulse 86   Temp 97.6 °F (36.4 °C) (Oral)   Resp 18   Wt 112 kg (247 lb)   SpO2 98%   BMI 46.69 kg/m²    General: Well developed, well nourished, alert and oriented x 3, in no acute distress. Appears well, in good spirits.   Head: ATNC   Eyes: PERRL, No evidence of conjunctivitis.   Nose: No nasal discharge.   Mouth: Oral mucosal membranes moist. No oral ulceration or hemorrhages.   Neck: Neck supple. No thyromegaly. No JVD.   Lungs: Clear in all fields to A&P without rales, rhonchi or wheezing.   Heart: S1, S2.No murmurs, rubs, or gallops.   Abdomen: Soft. Bowel sounds are normoactive.  Nontender with palpation.    Extremities: No cyanosis or edema. Peripheral pulses palpable and equal bilaterally.   Integumentary: No rash, sores, erythema or nodules. No blistering, bruising, or dry skin.   Hem/Lymph Nodes: No palpable cervical, submandibular, supraclavicular, axillary  lymphadenopathy noted. No petechiae, purpura or ecchymosis noted.   Neurologic: Grossly non-focal exam    Pain Score:  Pain Score    02/19/20 0947   PainSc: 0-No pain     RECENT LABS:  Lab Results   Component Value Date    WBC 4.49 02/19/2020    HGB 13.6 02/19/2020    HCT 42.9 02/19/2020    MCV 91.7 02/19/2020    RDW 15.1 02/19/2020     02/19/2020    NEUTRORELPCT 57.7 02/19/2020    LYMPHORELPCT 29.8 02/19/2020    MONORELPCT 9.6 02/19/2020    EOSRELPCT 0.9 02/19/2020    BASORELPCT 1.3 02/19/2020    NEUTROABS 2.59 02/19/2020    LYMPHSABS 1.34 02/19/2020       Lab Results   Component Value Date     01/02/2020    K 4.4 01/02/2020    CO2 23.5 01/02/2020     01/02/2020    BUN 9 01/02/2020    CREATININE 0.56 (L) 01/02/2020    EGFRIFNONA 116 01/02/2020    GLUCOSE 90 01/02/2020    CALCIUM 9.1 01/02/2020    ALKPHOS 72 01/02/2020    AST 25 01/02/2020    ALT 25 01/02/2020    BILITOT 0.5 01/02/2020    ALBUMIN 3.95 01/02/2020    PROTEINTOT 7.0 01/02/2020      Lab Results   Component Value Date    FERRITIN 161.20 (H) 02/19/2020    IRON 110 02/19/2020    TIBC 285 (L) 02/19/2020    LABIRON 39 02/19/2020    [id number] 731 11/19/2019    FOLATE 17.10 09/17/2019    HAPTOGLOBIN 177 09/17/2019    RETICCTPCT 1.19 09/17/2019    RETIC 0.0516 09/17/2019     Workup 9/17/19    Iron  37 - 145 mcg/dL 13 Abnormally low     Iron Saturation  20 - 50 % 3 Abnormally low     Transferrin  200 - 360 mg/dL 314    TIBC  298 - 536 mcg/dL 468      Ferritin  13.00 - 150.00 ng/mL 3.92 Abnormally low       Vitamin B-12  211 - 946 pg/mL 253      Folate  4.78 - 24.20 ng/mL 17.10      Reticulocyte %  0.70 - 1.90 % 1.19    Reticulocyte Absolute  0.0200 - 0.1300  10*6/mm3 0.0516      LDH  135 - 214 U/L 162      Haptoglobin  30 - 200 mg/dL 177      TSH  0.270 - 4.200 uIU/mL 1.660      Sed Rate  0 - 20 mm/hr 22 Abnormally high       C-Reactive Protein  0.00 - 0.50 mg/dL 0.11      Copper  72 - 166 ug/dL 132      Zinc  56 - 134 ug/dL 59        ASSESSMENT & PLAN:  Kadi Awad is a very pleasant 49 y.o. female with    1.  Iron deficiency anemia:  -Ms. Awad typically follows with PCP only as needed with blood testing every 6 months to a year. She reports she has been struggling with NOLAN over the past year.  Previous available labs reviewed.   -Unfortunately, she is unable to tolerate oral iron due to significant GI SE.  She denies having melena, rectal bleeding or hematuria. Denies menorrhagia. She has never had a screening colonoscopy.   -Obtained repeat CBC on initial consultation which showed stable Hg 9.3, platelets were elevated (likely reactive to NOLAN). Repeat iron studies remained very low. Therefore, we have been replacing with IV Feraheme as needed.    -She was referred to GI and had EGD done which was reportedly negative for bleeding. However, she says she has a hiatal hernia and was started on a PPI. She was also positive for H. Pylori and completed a course of antibiotics. She underwent colonoscopy on Monday which she says was negative for bleeding but did have 4 polyps removed. She will follow up with GI on 3/5 for path results. Reports unavailable, will request today.    -Also previously checked additional labs to further evaluate anemia including PBS as above. There was no evidence of hemolysis, B12 was low therefore, started replacement as below,  Folate replete, TSH was normal, ESR was mildly elevated and suggestive of underlying inflammation, CRP was normal, Copper and Zinc were normal. Tissue Transglutaminase was normal.   -Iron studies from January were low. Therefore, she was again replaced with Feraheme which she completed on 1/20.   -CBC from today  showing normal Hg and Iron studies more c/w AOCD/infammation.   -Will plan to follow up in 3 months with labs. In the meantime, she will follow up with GI as scheduled. She will let us know if she were to have any new/worsening symptoms and we would be happy to see her sooner and check labs.     2. B12 deficiency:  -She is taking SL B12 2500 mcg daily as advised. B12 now replete. Will monitor.     3. Thrombocytosis:  -Likely reactive and secondary to NOLAN. Platelet count normalized following replacement with IV iron. Will monitor.     ACO / ANDRE/Other  Quality measures  -  Kadi Awad received 2019 flu vaccine.  -  Kadi Awad reports a pain score of 0.    -  Current outpatient and discharge medications have been reconciled for the patient.  Reviewed by: LEILANI Hobson      The patient was in agreement with the plan and all questions were answered to her satisfaction.     Thank you so much for allowing us to participate in the care of Kadi Awad . Please do not hesitate to contact us with any questions or concerns.     A total of 25 minutes were spent coordinating this patient’s care in clinic today; more than 50% of this time was face-to-face with the patient, reviewing her medical history and counseling on the current treatment and followup plan. All questions were answered to her satisfaction.      Electronically Signed by: LEILANI Hobson , February 19, 2020 10:09 AM       CC:   Melodie Mejias PA

## 2020-05-28 ENCOUNTER — OFFICE VISIT (OUTPATIENT)
Dept: ONCOLOGY | Facility: CLINIC | Age: 50
End: 2020-05-28

## 2020-05-28 ENCOUNTER — LAB (OUTPATIENT)
Dept: ONCOLOGY | Facility: CLINIC | Age: 50
End: 2020-05-28

## 2020-05-28 VITALS
SYSTOLIC BLOOD PRESSURE: 141 MMHG | BODY MASS INDEX: 47.93 KG/M2 | OXYGEN SATURATION: 95 % | HEART RATE: 80 BPM | RESPIRATION RATE: 18 BRPM | TEMPERATURE: 97.6 F | WEIGHT: 253.53 LBS | DIASTOLIC BLOOD PRESSURE: 100 MMHG

## 2020-05-28 DIAGNOSIS — D50.9 IRON DEFICIENCY ANEMIA, UNSPECIFIED IRON DEFICIENCY ANEMIA TYPE: ICD-10-CM

## 2020-05-28 DIAGNOSIS — E53.8 B12 DEFICIENCY: ICD-10-CM

## 2020-05-28 DIAGNOSIS — K90.9 MALABSORPTION OF IRON: ICD-10-CM

## 2020-05-28 DIAGNOSIS — D50.9 IRON DEFICIENCY ANEMIA, UNSPECIFIED IRON DEFICIENCY ANEMIA TYPE: Primary | ICD-10-CM

## 2020-05-28 LAB
ALBUMIN SERPL-MCNC: 3.99 G/DL (ref 3.5–5.2)
ALBUMIN/GLOB SERPL: 1.4 G/DL
ALP SERPL-CCNC: 80 U/L (ref 39–117)
ALT SERPL W P-5'-P-CCNC: 36 U/L (ref 1–33)
ANION GAP SERPL CALCULATED.3IONS-SCNC: 12.7 MMOL/L (ref 5–15)
AST SERPL-CCNC: 34 U/L (ref 1–32)
BASOPHILS # BLD AUTO: 0.05 10*3/MM3 (ref 0–0.2)
BASOPHILS NFR BLD AUTO: 0.6 % (ref 0–1.5)
BILIRUB SERPL-MCNC: 0.5 MG/DL (ref 0.2–1.2)
BUN BLD-MCNC: 8 MG/DL (ref 6–20)
BUN/CREAT SERPL: 12.9 (ref 7–25)
CALCIUM SPEC-SCNC: 9.1 MG/DL (ref 8.6–10.5)
CHLORIDE SERPL-SCNC: 105 MMOL/L (ref 98–107)
CO2 SERPL-SCNC: 22.3 MMOL/L (ref 22–29)
CREAT BLD-MCNC: 0.62 MG/DL (ref 0.57–1)
DEPRECATED RDW RBC AUTO: 43.5 FL (ref 37–54)
EOSINOPHIL # BLD AUTO: 0.04 10*3/MM3 (ref 0–0.4)
EOSINOPHIL NFR BLD AUTO: 0.5 % (ref 0.3–6.2)
ERYTHROCYTE [DISTWIDTH] IN BLOOD BY AUTOMATED COUNT: 13 % (ref 12.3–15.4)
FERRITIN SERPL-MCNC: 73.82 NG/ML (ref 13–150)
GFR SERPL CREATININE-BSD FRML MDRD: 103 ML/MIN/1.73
GLOBULIN UR ELPH-MCNC: 2.9 GM/DL
GLUCOSE BLD-MCNC: 92 MG/DL (ref 65–99)
HCT VFR BLD AUTO: 44.8 % (ref 34–46.6)
HGB BLD-MCNC: 14.8 G/DL (ref 12–15.9)
IMM GRANULOCYTES # BLD AUTO: 0.06 10*3/MM3 (ref 0–0.05)
IMM GRANULOCYTES NFR BLD AUTO: 0.7 % (ref 0–0.5)
IRON 24H UR-MRATE: 55 MCG/DL (ref 37–145)
IRON SATN MFR SERPL: 18 % (ref 20–50)
LYMPHOCYTES # BLD AUTO: 1.68 10*3/MM3 (ref 0.7–3.1)
LYMPHOCYTES NFR BLD AUTO: 20.4 % (ref 19.6–45.3)
MCH RBC QN AUTO: 30.2 PG (ref 26.6–33)
MCHC RBC AUTO-ENTMCNC: 33 G/DL (ref 31.5–35.7)
MCV RBC AUTO: 91.4 FL (ref 79–97)
MONOCYTES # BLD AUTO: 0.63 10*3/MM3 (ref 0.1–0.9)
MONOCYTES NFR BLD AUTO: 7.6 % (ref 5–12)
NEUTROPHILS # BLD AUTO: 5.79 10*3/MM3 (ref 1.7–7)
NEUTROPHILS NFR BLD AUTO: 70.2 % (ref 42.7–76)
NRBC BLD AUTO-RTO: 0 /100 WBC (ref 0–0.2)
PLATELET # BLD AUTO: 342 10*3/MM3 (ref 140–450)
PMV BLD AUTO: 9.9 FL (ref 6–12)
POTASSIUM BLD-SCNC: 3.8 MMOL/L (ref 3.5–5.2)
PROT SERPL-MCNC: 6.9 G/DL (ref 6–8.5)
RBC # BLD AUTO: 4.9 10*6/MM3 (ref 3.77–5.28)
SODIUM BLD-SCNC: 140 MMOL/L (ref 136–145)
TIBC SERPL-MCNC: 302 MCG/DL (ref 298–536)
TRANSFERRIN SERPL-MCNC: 203 MG/DL (ref 200–360)
WBC NRBC COR # BLD: 8.25 10*3/MM3 (ref 3.4–10.8)

## 2020-05-28 PROCEDURE — 84466 ASSAY OF TRANSFERRIN: CPT | Performed by: NURSE PRACTITIONER

## 2020-05-28 PROCEDURE — 85025 COMPLETE CBC W/AUTO DIFF WBC: CPT | Performed by: NURSE PRACTITIONER

## 2020-05-28 PROCEDURE — 99214 OFFICE O/P EST MOD 30 MIN: CPT | Performed by: NURSE PRACTITIONER

## 2020-05-28 PROCEDURE — 82607 VITAMIN B-12: CPT | Performed by: NURSE PRACTITIONER

## 2020-05-28 PROCEDURE — 82728 ASSAY OF FERRITIN: CPT | Performed by: NURSE PRACTITIONER

## 2020-05-28 PROCEDURE — 83540 ASSAY OF IRON: CPT | Performed by: NURSE PRACTITIONER

## 2020-05-28 PROCEDURE — 80053 COMPREHEN METABOLIC PANEL: CPT | Performed by: NURSE PRACTITIONER

## 2020-05-28 NOTE — PROGRESS NOTES
DATE:  5/28/2020    REASON FOR FOLLOW UP: NOLAN    REFERRING PHYSICIAN:  ALINE Vora    CHIEF COMPLAINT:  Follow up of NOLAN    TREATMENT:  1. Oral iron-unable to tolerate due to significant GI SE    2. IV Feraheme      3. SL B12 replacement-started mid-September 2019    HISTORY OF PRESENT ILLNESS:   Kadi Awad is a very pleasant 49 y.o. female who is being seen today at the request of ALINE Vora for evaluation and treatment of NOLAN. Ms. Awad reports following with her PCP as needed and typically has blood testing done every 6 months to a year. She reports she has been struggling with NOLAN over the past year. Otherwise, she was not aware of this before as she doesn't follow up routinely. She says she feels exhausted all the time. She also has shortness of breath on exertion. She previously tried taking oral iron therapy but she says this causes significant nausea and she is unable to tolerate it. At present, she is not taking any oral iron. She says she did receive one IV iron infusion at the end of July per PCP. She denies having menorrhagia. Denies melena, rectal bleeding or hematuria. She has never had a screening colonoscopy. She denies any other complaints today.     INTERVAL HISTORY:  Ms. Awad presents today for follow up of NOLAN. Overall, she reports that she has been doing well. She was last replaced with IV Feraheme on 01/20/20. She continues to report that her fatigue has improved. She denies any melena, rectal bleeding, hematuria, or menorrhagia. She had a colonoscopy in February and reports that polyps were benign and no evidence of bleeding will plan to repeat colonoscopy in 3 years. Blood pressure elevated she plans to monitor at home and follow with PCP. She denies any other complaints today.       PAST MEDICAL HISTORY:  Past Medical History:   Diagnosis Date   • Hypertension        PAST SURGICAL HISTORY:  Past Surgical History:   Procedure Laterality Date   • SPINE SURGERY         steel froy for scoliosis       FAMILY HISTORY:  History reviewed. No pertinent family history.    SOCIAL HISTORY:  Social History     Socioeconomic History   • Marital status: Single     Spouse name: Not on file   • Number of children: Not on file   • Years of education: Not on file   • Highest education level: Not on file   Tobacco Use   • Smoking status: Never Smoker   • Smokeless tobacco: Never Used   Substance and Sexual Activity   • Alcohol use: No     Frequency: Never   • Drug use: No   • Sexual activity: Defer     MEDICATIONS:  The current medication list was reviewed in the EMR    Current Outpatient Medications:   •  lisinopril (PRINIVIL,ZESTRIL) 10 MG tablet, Take 10 mg by mouth Daily., Disp: , Rfl:   •  loratadine (ALLERGY) 10 MG tablet, Take 10 mg by mouth Daily., Disp: , Rfl:   •  pantoprazole (PROTONIX) 40 MG EC tablet, Take 40 mg by mouth Daily., Disp: , Rfl:   •  vitamin B-12 (CYANOCOBALAMIN) 100 MCG tablet, Take 50 mcg by mouth Daily., Disp: , Rfl:     ALLERGIES:    Allergies   Allergen Reactions   • Minocycline-Acne Care Products Other (See Comments) and Dizziness     headaches     REVIEW OF SYSTEMS:    A comprehensive 14 point review of systems was performed.  Significant findings as mentioned above.  All other systems reviewed and are negative.      Physical Exam   Vital Signs: /100   Pulse 80   Temp 97.6 °F (36.4 °C) (Oral)   Resp 18   Wt 115 kg (253 lb 8.5 oz)   SpO2 95%   BMI 47.93 kg/m²    General: Well developed, well nourished, alert and oriented x 3, in no acute distress.  Head: ATNC   Eyes: PERRL, No evidence of conjunctivitis.   Nose: No nasal discharge.   Mouth: Oral mucosal membranes moist. No oral ulceration or hemorrhages.   Neck: Neck supple. No thyromegaly. No JVD.   Lungs: Clear in all fields to A&P without rales, rhonchi or wheezing.   Heart: S1, S2. No murmurs, rubs, or gallops.   Abdomen: Soft. Bowel sounds are normoactive. Nontender with palpation.    Extremities:  No cyanosis or edema. Peripheral pulses palpable and equal bilaterally.   Integumentary: No rash, sores, erythema or nodules. No blistering, bruising, or dry skin.   Hem/Lymph Nodes: No palpable cervical, submandibular, supraclavicular, axillary  lymphadenopathy noted. No petechiae, purpura or ecchymosis noted.   Neurologic: Grossly non-focal exam    Pain Score:  Pain Score    05/28/20 1432   PainSc: 0-No pain     RECENT LABS:  Lab Results   Component Value Date    WBC 8.25 05/28/2020    HGB 14.8 05/28/2020    HCT 44.8 05/28/2020    MCV 91.4 05/28/2020    RDW 13.0 05/28/2020     05/28/2020    NEUTRORELPCT 70.2 05/28/2020    LYMPHORELPCT 20.4 05/28/2020    MONORELPCT 7.6 05/28/2020    EOSRELPCT 0.5 05/28/2020    BASORELPCT 0.6 05/28/2020    NEUTROABS 5.79 05/28/2020    LYMPHSABS 1.68 05/28/2020       Lab Results   Component Value Date     05/28/2020    K 3.8 05/28/2020    CO2 22.3 05/28/2020     05/28/2020    BUN 8 05/28/2020    CREATININE 0.62 05/28/2020    EGFRIFNONA 103 05/28/2020    GLUCOSE 92 05/28/2020    CALCIUM 9.1 05/28/2020    ALKPHOS 80 05/28/2020    AST 34 (H) 05/28/2020    ALT 36 (H) 05/28/2020    BILITOT 0.5 05/28/2020    ALBUMIN 3.99 05/28/2020    PROTEINTOT 6.9 05/28/2020      Lab Results   Component Value Date    FERRITIN 73.82 05/28/2020    IRON 55 05/28/2020    TIBC 302 05/28/2020    LABIRON 18 (L) 05/28/2020    [id number] 731 11/19/2019    FOLATE 17.10 09/17/2019    HAPTOGLOBIN 177 09/17/2019    RETICCTPCT 1.19 09/17/2019    RETIC 0.0516 09/17/2019     Workup 9/17/19    Iron  37 - 145 mcg/dL 13 Abnormally low     Iron Saturation  20 - 50 % 3 Abnormally low     Transferrin  200 - 360 mg/dL 314    TIBC  298 - 536 mcg/dL 468      Ferritin  13.00 - 150.00 ng/mL 3.92 Abnormally low       Vitamin B-12  211 - 946 pg/mL 253      Folate  4.78 - 24.20 ng/mL 17.10      Reticulocyte %  0.70 - 1.90 % 1.19    Reticulocyte Absolute  0.0200 - 0.1300 10*6/mm3 0.0516      LDH  135 - 214 U/L 162        Haptoglobin  30 - 200 mg/dL 177      TSH  0.270 - 4.200 uIU/mL 1.660      Sed Rate  0 - 20 mm/hr 22 Abnormally high       C-Reactive Protein  0.00 - 0.50 mg/dL 0.11      Copper  72 - 166 ug/dL 132      Zinc  56 - 134 ug/dL 59      Endoscopy:    Colonoscopy 02-10-20      Pathology:  02-10-20      ASSESSMENT & PLAN:  Kadi Awad is a very pleasant 49 y.o. female with    1.  Iron deficiency anemia:  -Ms. Awad typically follows with PCP only as needed with blood testing every 6 months to a year. She reports she has been struggling with NOLAN over the past year. Previous available labs reviewed.   -Unfortunately, she is unable to tolerate oral iron due to significant GI SE.  She denies having melena, rectal bleeding or hematuria. Denies menorrhagia.   -Obtained repeat CBC on initial consultation which showed stable Hg 9.3, platelets were elevated (likely reactive to NOLAN). Repeat iron studies remained very low. Therefore, we have been replacing with IV Feraheme as needed.    -She was referred to GI and had EGD done which was reportedly negative for bleeding. However, she says she has a hiatal hernia and was started on a PPI. She was also positive for H. Pylori and completed a course of antibiotics. She underwent colonoscopy on Monday which she says was negative for bleeding but did have 4 polyps removed. Pathology summarized above and was negative for high grade dysplasia or malignancy.   -Also previously checked additional labs to further evaluate anemia including PBS as above. There was no evidence of hemolysis, B12 was low therefore, started replacement as below,  Folate replete, TSH was normal, ESR was mildly elevated and suggestive of underlying inflammation, CRP was normal, Copper and Zinc were normal. Tissue Transglutaminase was normal.   -Iron studies from January were low. Therefore, she was again replaced with Feraheme which she completed on 1/20.   -CBC from today showing normal Hg. Iron Sat is low normal  and ferritin remains within normal but has dropped since prior presentation.  -Will repeat labs in 6 weeks and replace with IV Feraheme as needed. Will plan to follow up in 3 months with labs.     2. B12 deficiency:  -She is taking SL B12 2500 mcg daily as advised. B12 now replete. Will monitor.     3. Thrombocytosis:  -Likely reactive and secondary to NOLAN. Platelet count normalized following replacement with IV iron. Will monitor.     ACO / ANDRE/Other  Quality measures  -  Kadi Awad received 2019 flu vaccine.  -  Kadi Awad reports a pain score of 0.    -  Current outpatient and discharge medications have been reconciled for the patient.  Reviewed by: LEILANI Barboza            The patient was in agreement with the plan and all questions were answered to her satisfaction.     Thank you so much for allowing us to participate in the care of Kadi Awad . Please do not hesitate to contact us with any questions or concerns.     A total of 25 minutes were spent coordinating this patient’s care in clinic today; more than 50% of this time was face-to-face with the patient, reviewing her medical history and counseling on the current treatment and followup plan. All questions were answered to her satisfaction.      Electronically Signed by: LEILANI Newman , May 28, 2020 15:36       CC:   Melodie Mejias PA

## 2020-05-29 LAB — VIT B12 BLD-MCNC: >2000 PG/ML (ref 211–946)

## 2020-07-06 ENCOUNTER — LAB (OUTPATIENT)
Dept: ONCOLOGY | Facility: CLINIC | Age: 50
End: 2020-07-06

## 2020-07-06 DIAGNOSIS — K90.9 MALABSORPTION OF IRON: ICD-10-CM

## 2020-07-06 DIAGNOSIS — D50.9 IRON DEFICIENCY ANEMIA, UNSPECIFIED IRON DEFICIENCY ANEMIA TYPE: ICD-10-CM

## 2020-07-06 LAB
ALBUMIN SERPL-MCNC: 3.8 G/DL (ref 3.5–5.2)
ALBUMIN/GLOB SERPL: 1.3 G/DL
ALP SERPL-CCNC: 68 U/L (ref 39–117)
ALT SERPL W P-5'-P-CCNC: 19 U/L (ref 1–33)
ANION GAP SERPL CALCULATED.3IONS-SCNC: 12.1 MMOL/L (ref 5–15)
AST SERPL-CCNC: 19 U/L (ref 1–32)
BASOPHILS # BLD AUTO: 0.07 10*3/MM3 (ref 0–0.2)
BASOPHILS NFR BLD AUTO: 1 % (ref 0–1.5)
BILIRUB SERPL-MCNC: 0.4 MG/DL (ref 0.2–1.2)
BUN SERPL-MCNC: 13 MG/DL (ref 6–20)
BUN/CREAT SERPL: 22.8 (ref 7–25)
CALCIUM SPEC-SCNC: 9.2 MG/DL (ref 8.6–10.5)
CHLORIDE SERPL-SCNC: 102 MMOL/L (ref 98–107)
CO2 SERPL-SCNC: 24.9 MMOL/L (ref 22–29)
CREAT SERPL-MCNC: 0.57 MG/DL (ref 0.57–1)
DEPRECATED RDW RBC AUTO: 42.5 FL (ref 37–54)
EOSINOPHIL # BLD AUTO: 0.07 10*3/MM3 (ref 0–0.4)
EOSINOPHIL NFR BLD AUTO: 1 % (ref 0.3–6.2)
ERYTHROCYTE [DISTWIDTH] IN BLOOD BY AUTOMATED COUNT: 12.5 % (ref 12.3–15.4)
FERRITIN SERPL-MCNC: 127.3 NG/ML (ref 13–150)
GFR SERPL CREATININE-BSD FRML MDRD: 114 ML/MIN/1.73
GLOBULIN UR ELPH-MCNC: 3 GM/DL
GLUCOSE SERPL-MCNC: 84 MG/DL (ref 65–99)
HCT VFR BLD AUTO: 42.8 % (ref 34–46.6)
HGB BLD-MCNC: 14 G/DL (ref 12–15.9)
IMM GRANULOCYTES # BLD AUTO: 0.05 10*3/MM3 (ref 0–0.05)
IMM GRANULOCYTES NFR BLD AUTO: 0.7 % (ref 0–0.5)
IRON 24H UR-MRATE: 102 MCG/DL (ref 37–145)
IRON SATN MFR SERPL: 33 % (ref 20–50)
LYMPHOCYTES # BLD AUTO: 1.96 10*3/MM3 (ref 0.7–3.1)
LYMPHOCYTES NFR BLD AUTO: 26.8 % (ref 19.6–45.3)
MCH RBC QN AUTO: 30.4 PG (ref 26.6–33)
MCHC RBC AUTO-ENTMCNC: 32.7 G/DL (ref 31.5–35.7)
MCV RBC AUTO: 92.8 FL (ref 79–97)
MONOCYTES # BLD AUTO: 0.69 10*3/MM3 (ref 0.1–0.9)
MONOCYTES NFR BLD AUTO: 9.4 % (ref 5–12)
NEUTROPHILS NFR BLD AUTO: 4.48 10*3/MM3 (ref 1.7–7)
NEUTROPHILS NFR BLD AUTO: 61.1 % (ref 42.7–76)
NRBC BLD AUTO-RTO: 0 /100 WBC (ref 0–0.2)
PLATELET # BLD AUTO: 286 10*3/MM3 (ref 140–450)
PMV BLD AUTO: 10 FL (ref 6–12)
POTASSIUM SERPL-SCNC: 3.9 MMOL/L (ref 3.5–5.2)
PROT SERPL-MCNC: 6.8 G/DL (ref 6–8.5)
RBC # BLD AUTO: 4.61 10*6/MM3 (ref 3.77–5.28)
SODIUM SERPL-SCNC: 139 MMOL/L (ref 136–145)
TIBC SERPL-MCNC: 308 MCG/DL (ref 298–536)
TRANSFERRIN SERPL-MCNC: 207 MG/DL (ref 200–360)
WBC # BLD AUTO: 7.32 10*3/MM3 (ref 3.4–10.8)

## 2020-07-06 PROCEDURE — 80053 COMPREHEN METABOLIC PANEL: CPT | Performed by: NURSE PRACTITIONER

## 2020-07-06 PROCEDURE — 83540 ASSAY OF IRON: CPT | Performed by: NURSE PRACTITIONER

## 2020-07-06 PROCEDURE — 85025 COMPLETE CBC W/AUTO DIFF WBC: CPT | Performed by: NURSE PRACTITIONER

## 2020-07-06 PROCEDURE — 82728 ASSAY OF FERRITIN: CPT | Performed by: NURSE PRACTITIONER

## 2020-07-06 PROCEDURE — 84466 ASSAY OF TRANSFERRIN: CPT | Performed by: NURSE PRACTITIONER

## 2020-08-20 ENCOUNTER — OFFICE VISIT (OUTPATIENT)
Dept: ONCOLOGY | Facility: CLINIC | Age: 50
End: 2020-08-20

## 2020-08-20 ENCOUNTER — LAB (OUTPATIENT)
Dept: ONCOLOGY | Facility: CLINIC | Age: 50
End: 2020-08-20

## 2020-08-20 VITALS
SYSTOLIC BLOOD PRESSURE: 123 MMHG | WEIGHT: 253 LBS | DIASTOLIC BLOOD PRESSURE: 75 MMHG | OXYGEN SATURATION: 98 % | HEART RATE: 108 BPM | RESPIRATION RATE: 18 BRPM | BODY MASS INDEX: 47.83 KG/M2 | TEMPERATURE: 98 F

## 2020-08-20 DIAGNOSIS — D50.9 IRON DEFICIENCY ANEMIA, UNSPECIFIED IRON DEFICIENCY ANEMIA TYPE: ICD-10-CM

## 2020-08-20 DIAGNOSIS — D50.9 IRON DEFICIENCY ANEMIA, UNSPECIFIED IRON DEFICIENCY ANEMIA TYPE: Primary | ICD-10-CM

## 2020-08-20 DIAGNOSIS — K90.9 MALABSORPTION OF IRON: ICD-10-CM

## 2020-08-20 DIAGNOSIS — E53.8 B12 DEFICIENCY: ICD-10-CM

## 2020-08-20 LAB
ALBUMIN SERPL-MCNC: 4.29 G/DL (ref 3.5–5.2)
ALBUMIN/GLOB SERPL: 1.6 G/DL
ALP SERPL-CCNC: 63 U/L (ref 39–117)
ALT SERPL W P-5'-P-CCNC: 22 U/L (ref 1–33)
ANION GAP SERPL CALCULATED.3IONS-SCNC: 11 MMOL/L (ref 5–15)
AST SERPL-CCNC: 26 U/L (ref 1–32)
BASOPHILS # BLD AUTO: 0.07 10*3/MM3 (ref 0–0.2)
BASOPHILS NFR BLD AUTO: 0.9 % (ref 0–1.5)
BILIRUB SERPL-MCNC: 0.4 MG/DL (ref 0–1.2)
BUN SERPL-MCNC: 11 MG/DL (ref 6–20)
BUN/CREAT SERPL: 17.7 (ref 7–25)
CALCIUM SPEC-SCNC: 9.5 MG/DL (ref 8.6–10.5)
CHLORIDE SERPL-SCNC: 103 MMOL/L (ref 98–107)
CO2 SERPL-SCNC: 27 MMOL/L (ref 22–29)
CREAT SERPL-MCNC: 0.62 MG/DL (ref 0.57–1)
DEPRECATED RDW RBC AUTO: 41.4 FL (ref 37–54)
EOSINOPHIL # BLD AUTO: 0.03 10*3/MM3 (ref 0–0.4)
EOSINOPHIL NFR BLD AUTO: 0.4 % (ref 0.3–6.2)
ERYTHROCYTE [DISTWIDTH] IN BLOOD BY AUTOMATED COUNT: 12.6 % (ref 12.3–15.4)
FERRITIN SERPL-MCNC: 73.71 NG/ML (ref 13–150)
GFR SERPL CREATININE-BSD FRML MDRD: 103 ML/MIN/1.73
GLOBULIN UR ELPH-MCNC: 2.7 GM/DL
GLUCOSE SERPL-MCNC: 97 MG/DL (ref 65–99)
HCT VFR BLD AUTO: 41.1 % (ref 34–46.6)
HGB BLD-MCNC: 13.7 G/DL (ref 12–15.9)
IMM GRANULOCYTES # BLD AUTO: 0.04 10*3/MM3 (ref 0–0.05)
IMM GRANULOCYTES NFR BLD AUTO: 0.5 % (ref 0–0.5)
IRON 24H UR-MRATE: 65 MCG/DL (ref 37–145)
IRON SATN MFR SERPL: 22 % (ref 20–50)
LYMPHOCYTES # BLD AUTO: 1.98 10*3/MM3 (ref 0.7–3.1)
LYMPHOCYTES NFR BLD AUTO: 24.4 % (ref 19.6–45.3)
MCH RBC QN AUTO: 30 PG (ref 26.6–33)
MCHC RBC AUTO-ENTMCNC: 33.3 G/DL (ref 31.5–35.7)
MCV RBC AUTO: 90.1 FL (ref 79–97)
MONOCYTES # BLD AUTO: 0.68 10*3/MM3 (ref 0.1–0.9)
MONOCYTES NFR BLD AUTO: 8.4 % (ref 5–12)
NEUTROPHILS NFR BLD AUTO: 5.32 10*3/MM3 (ref 1.7–7)
NEUTROPHILS NFR BLD AUTO: 65.4 % (ref 42.7–76)
NRBC BLD AUTO-RTO: 0 /100 WBC (ref 0–0.2)
PLATELET # BLD AUTO: 360 10*3/MM3 (ref 140–450)
PMV BLD AUTO: 9.8 FL (ref 6–12)
POTASSIUM SERPL-SCNC: 3.5 MMOL/L (ref 3.5–5.2)
PROT SERPL-MCNC: 7 G/DL (ref 6–8.5)
RBC # BLD AUTO: 4.56 10*6/MM3 (ref 3.77–5.28)
SODIUM SERPL-SCNC: 141 MMOL/L (ref 136–145)
TIBC SERPL-MCNC: 299 MCG/DL (ref 298–536)
TRANSFERRIN SERPL-MCNC: 201 MG/DL (ref 200–360)
WBC # BLD AUTO: 8.12 10*3/MM3 (ref 3.4–10.8)

## 2020-08-20 PROCEDURE — 80053 COMPREHEN METABOLIC PANEL: CPT | Performed by: NURSE PRACTITIONER

## 2020-08-20 PROCEDURE — 99213 OFFICE O/P EST LOW 20 MIN: CPT | Performed by: NURSE PRACTITIONER

## 2020-08-20 PROCEDURE — 83540 ASSAY OF IRON: CPT | Performed by: NURSE PRACTITIONER

## 2020-08-20 PROCEDURE — 82728 ASSAY OF FERRITIN: CPT | Performed by: NURSE PRACTITIONER

## 2020-08-20 PROCEDURE — 85025 COMPLETE CBC W/AUTO DIFF WBC: CPT | Performed by: NURSE PRACTITIONER

## 2020-08-20 PROCEDURE — 82607 VITAMIN B-12: CPT | Performed by: NURSE PRACTITIONER

## 2020-08-20 PROCEDURE — 84466 ASSAY OF TRANSFERRIN: CPT | Performed by: NURSE PRACTITIONER

## 2020-08-20 RX ORDER — LISINOPRIL AND HYDROCHLOROTHIAZIDE 20; 12.5 MG/1; MG/1
1 TABLET ORAL DAILY
COMMUNITY
Start: 2020-06-09

## 2020-08-20 NOTE — PROGRESS NOTES
DATE:  8/20/2020    REASON FOR FOLLOW UP: NOLAN    REFERRING PHYSICIAN:  ALINE Vora    CHIEF COMPLAINT:  Follow up of NOLAN    TREATMENT:  1. Oral iron-unable to tolerate due to significant GI SE    2. IV Feraheme      3. SL B12 replacement-started mid-September 2019    HISTORY OF PRESENT ILLNESS:   Kadi Awad is a very pleasant 50 y.o. female who is being seen today at the request of ALINE Vora for evaluation and treatment of NOLAN. Ms. Awad reports following with her PCP as needed and typically has blood testing done every 6 months to a year. She reports she has been struggling with NOLAN over the past year. Otherwise, she was not aware of this before as she doesn't follow up routinely. She says she feels exhausted all the time. She also has shortness of breath on exertion. She previously tried taking oral iron therapy but she says this causes significant nausea and she is unable to tolerate it. At present, she is not taking any oral iron. She says she did receive one IV iron infusion at the end of July per PCP. She denies having menorrhagia. Denies melena, rectal bleeding or hematuria. She has never had a screening colonoscopy. She denies any other complaints today.     INTERVAL HISTORY:  Ms. Awad presents today for follow up of NOLAN. She reports that she has been doing well since her last visit. She was last replaced with IV Feraheme on 01/20/2020. She reports fatigue but denies any worsening. She does report that in June she had a heavy period that lasted for 5 weeks, she was seen by Gyn and they are continuing to follow her closely. She denies melena, rectal bleeding or hematuria. She denies chest pain, dizziness or shortness of breath on exertion. She denies any other specific complaints today.         PAST MEDICAL HISTORY:  Past Medical History:   Diagnosis Date   • Hypertension        PAST SURGICAL HISTORY:  Past Surgical History:   Procedure Laterality Date   • SPINE SURGERY       steel froy for scoliosis       FAMILY HISTORY:  History reviewed. No pertinent family history.    SOCIAL HISTORY:  Social History     Socioeconomic History   • Marital status: Single     Spouse name: Not on file   • Number of children: Not on file   • Years of education: Not on file   • Highest education level: Not on file   Tobacco Use   • Smoking status: Never Smoker   • Smokeless tobacco: Never Used   Substance and Sexual Activity   • Alcohol use: No     Frequency: Never   • Drug use: No   • Sexual activity: Defer     MEDICATIONS:  The current medication list was reviewed in the EMR    Current Outpatient Medications:   •  lisinopril-hydrochlorothiazide (PRINZIDE,ZESTORETIC) 20-12.5 MG per tablet, Take 1 tablet by mouth Daily., Disp: , Rfl:   •  loratadine (ALLERGY) 10 MG tablet, Take 10 mg by mouth Daily., Disp: , Rfl:   •  pantoprazole (PROTONIX) 40 MG EC tablet, Take 40 mg by mouth Daily., Disp: , Rfl:   •  vitamin B-12 (CYANOCOBALAMIN) 100 MCG tablet, Take 50 mcg by mouth Daily., Disp: , Rfl:     ALLERGIES:    Allergies   Allergen Reactions   • Minocycline-Acne Care Products Other (See Comments) and Dizziness     headaches     REVIEW OF SYSTEMS:    A comprehensive 14 point review of systems was performed.  Significant findings as mentioned above.  All other systems reviewed and are negative.      Physical Exam   Vital Signs: /75   Pulse 108   Temp 98 °F (36.7 °C) (Temporal)   Resp 18   Wt 115 kg (253 lb)   SpO2 98%   BMI 47.83 kg/m²    General: Well developed, well nourished, alert and oriented x 3, in no acute distress.  Head: ATNC   Eyes: PERRL, No evidence of conjunctivitis.   Nose: No nasal discharge.   Mouth: Oral mucosal membranes moist. No oral ulceration or hemorrhages.   Neck: Neck supple. No thyromegaly. No JVD.   Lungs: Clear in all fields to A&P without rales, rhonchi or wheezing.   Heart: S1, S2. No murmurs, rubs, or gallops.   Abdomen: Soft. Bowel sounds are normoactive. Nontender with  palpation.    Extremities: No cyanosis or edema.   Integumentary: No rash, sores, erythema or nodules. No blistering, bruising, or dry skin.   Neurologic: Grossly non-focal exam    Pain Score:  Pain Score    08/20/20 1438   PainSc: 0-No pain     RECENT LABS:  Lab Results   Component Value Date    WBC 8.12 08/20/2020    HGB 13.7 08/20/2020    HCT 41.1 08/20/2020    MCV 90.1 08/20/2020    RDW 12.6 08/20/2020     08/20/2020    NEUTRORELPCT 65.4 08/20/2020    LYMPHORELPCT 24.4 08/20/2020    MONORELPCT 8.4 08/20/2020    EOSRELPCT 0.4 08/20/2020    BASORELPCT 0.9 08/20/2020    NEUTROABS 5.32 08/20/2020    LYMPHSABS 1.98 08/20/2020       Lab Results   Component Value Date     08/20/2020    K 3.5 08/20/2020    CO2 27.0 08/20/2020     08/20/2020    BUN 11 08/20/2020    CREATININE 0.62 08/20/2020    EGFRIFNONA 103 08/20/2020    GLUCOSE 97 08/20/2020    CALCIUM 9.5 08/20/2020    ALKPHOS 63 08/20/2020    AST 26 08/20/2020    ALT 22 08/20/2020    BILITOT 0.4 08/20/2020    ALBUMIN 4.29 08/20/2020    PROTEINTOT 7.0 08/20/2020      Lab Results   Component Value Date    FERRITIN 73.71 08/20/2020    IRON 65 08/20/2020    TIBC 299 08/20/2020    LABIRON 22 08/20/2020    [id number] >2,000 (H) 05/28/2020    FOLATE 17.10 09/17/2019    HAPTOGLOBIN 177 09/17/2019    RETICCTPCT 1.19 09/17/2019    RETIC 0.0516 09/17/2019     Workup 9/17/19    Iron  37 - 145 mcg/dL 13 Abnormally low     Iron Saturation  20 - 50 % 3 Abnormally low     Transferrin  200 - 360 mg/dL 314    TIBC  298 - 536 mcg/dL 468      Ferritin  13.00 - 150.00 ng/mL 3.92 Abnormally low       Vitamin B-12  211 - 946 pg/mL 253      Folate  4.78 - 24.20 ng/mL 17.10      Reticulocyte %  0.70 - 1.90 % 1.19    Reticulocyte Absolute  0.0200 - 0.1300 10*6/mm3 0.0516      LDH  135 - 214 U/L 162      Haptoglobin  30 - 200 mg/dL 177      TSH  0.270 - 4.200 uIU/mL 1.660      Sed Rate  0 - 20 mm/hr 22 Abnormally high       C-Reactive Protein  0.00 - 0.50 mg/dL 0.11           Copper  72 - 166 ug/dL 132      Zinc  56 - 134 ug/dL 59      Endoscopy:    Colonoscopy 02-10-20      Pathology:  02-10-20      ASSESSMENT & PLAN:  Kadi Awad is a very pleasant 50 y.o. female with    1.  Iron deficiency anemia:  -Ms. Awad typically follows with PCP only as needed with blood testing every 6 months to a year. She reports she has been struggling with NOLAN over the past year. Previous available labs reviewed.   -Unfortunately, she is unable to tolerate oral iron due to significant GI SE.  She denies having melena, rectal bleeding or hematuria. Denies menorrhagia.   -Obtained repeat CBC on initial consultation which showed stable Hg 9.3, platelets were elevated (likely reactive to NOLAN). Repeat iron studies remained very low. Therefore, we have been replacing with IV Feraheme as needed.    -She was referred to GI and had EGD done which was reportedly negative for bleeding. However, she says she has a hiatal hernia and was started on a PPI. She was also positive for H. Pylori and completed a course of antibiotics. She underwent colonoscopy on Monday which she says was negative for bleeding but did have 4 polyps removed. Pathology summarized above and was negative for high grade dysplasia or malignancy.   -Also previously checked additional labs to further evaluate anemia including PBS as above. There was no evidence of hemolysis, B12 was low therefore, started replacement as below,  Folate replete, TSH was normal, ESR was mildly elevated and suggestive of underlying inflammation, CRP was normal, Copper and Zinc were normal. Tissue Transglutaminase was normal.   -Iron studies from January were low. Therefore, she was again replaced with Feraheme which she completed on 1/20.   -CBC from today showing normal Hg. Iron panel and ferritin remain normal.  - Will repeat labs in 2 months and follow up in 4 months with labs and replace with IV Feraheme as needed.   - Advised patient to call if she develops  worsening symptoms including weakness/fatigue, chest pain, shortness of breath on exertion or dizziness and we will be happy to check labs sooner.    2. B12 deficiency:  -She is taking SL B12 2500 mcg daily as advised. B12 now replete. Will monitor.     3. Thrombocytosis:  -Likely reactive and secondary to NOLAN. Platelet count normalized following replacement with IV iron. Will monitor.     ACO / ANDRE/Other  Quality measures  -  Kadi Awad received 2019 flu vaccine.  -  Kadi Awad reports a pain score of 0.    -  Current outpatient and discharge medications have been reconciled for the patient.  Reviewed by: LEILANI Barboza    The patient was in agreement with the plan and all questions were answered to her satisfaction.     Thank you so much for allowing us to participate in the care of Kadi Awad . Please do not hesitate to contact us with any questions or concerns.     A total of 15 minutes were spent coordinating this patient’s care in clinic today; more than 50% of this time was face-to-face with the patient, reviewing her medical history and counseling on the current treatment and followup plan. All questions were answered to her satisfaction.      Electronically Signed by: LEILANI Newman , August 20, 2020 15:53       CC:   Melodie Mejias PA

## 2020-08-21 LAB — VIT B12 BLD-MCNC: >2000 PG/ML (ref 211–946)

## 2020-10-20 ENCOUNTER — LAB (OUTPATIENT)
Dept: ONCOLOGY | Facility: CLINIC | Age: 50
End: 2020-10-20

## 2020-10-20 VITALS
OXYGEN SATURATION: 96 % | DIASTOLIC BLOOD PRESSURE: 91 MMHG | HEART RATE: 110 BPM | WEIGHT: 253 LBS | BODY MASS INDEX: 47.77 KG/M2 | HEIGHT: 61 IN | SYSTOLIC BLOOD PRESSURE: 123 MMHG | TEMPERATURE: 97.5 F

## 2020-10-20 DIAGNOSIS — K90.9 MALABSORPTION OF IRON: ICD-10-CM

## 2020-10-20 DIAGNOSIS — D50.9 IRON DEFICIENCY ANEMIA, UNSPECIFIED IRON DEFICIENCY ANEMIA TYPE: ICD-10-CM

## 2020-10-20 LAB
ALBUMIN SERPL-MCNC: 4.26 G/DL (ref 3.5–5.2)
ALBUMIN/GLOB SERPL: 1.4 G/DL
ALP SERPL-CCNC: 72 U/L (ref 39–117)
ALT SERPL W P-5'-P-CCNC: 30 U/L (ref 1–33)
ANION GAP SERPL CALCULATED.3IONS-SCNC: 8.7 MMOL/L (ref 5–15)
AST SERPL-CCNC: 30 U/L (ref 1–32)
BASOPHILS # BLD AUTO: 0.06 10*3/MM3 (ref 0–0.2)
BASOPHILS NFR BLD AUTO: 0.9 % (ref 0–1.5)
BILIRUB SERPL-MCNC: 0.4 MG/DL (ref 0–1.2)
BUN SERPL-MCNC: 13 MG/DL (ref 6–20)
BUN/CREAT SERPL: 21.3 (ref 7–25)
CALCIUM SPEC-SCNC: 9.5 MG/DL (ref 8.6–10.5)
CHLORIDE SERPL-SCNC: 106 MMOL/L (ref 98–107)
CO2 SERPL-SCNC: 25.3 MMOL/L (ref 22–29)
CREAT SERPL-MCNC: 0.61 MG/DL (ref 0.57–1)
DEPRECATED RDW RBC AUTO: 44.3 FL (ref 37–54)
EOSINOPHIL # BLD AUTO: 0.03 10*3/MM3 (ref 0–0.4)
EOSINOPHIL NFR BLD AUTO: 0.5 % (ref 0.3–6.2)
ERYTHROCYTE [DISTWIDTH] IN BLOOD BY AUTOMATED COUNT: 13.1 % (ref 12.3–15.4)
FERRITIN SERPL-MCNC: 72.4 NG/ML (ref 13–150)
GFR SERPL CREATININE-BSD FRML MDRD: 105 ML/MIN/1.73
GLOBULIN UR ELPH-MCNC: 2.9 GM/DL
GLUCOSE SERPL-MCNC: 100 MG/DL (ref 65–99)
HCT VFR BLD AUTO: 41.8 % (ref 34–46.6)
HGB BLD-MCNC: 13.4 G/DL (ref 12–15.9)
IMM GRANULOCYTES # BLD AUTO: 0.06 10*3/MM3 (ref 0–0.05)
IMM GRANULOCYTES NFR BLD AUTO: 0.9 % (ref 0–0.5)
IRON 24H UR-MRATE: 61 MCG/DL (ref 37–145)
IRON SATN MFR SERPL: 18 % (ref 20–50)
LYMPHOCYTES # BLD AUTO: 1.75 10*3/MM3 (ref 0.7–3.1)
LYMPHOCYTES NFR BLD AUTO: 26.9 % (ref 19.6–45.3)
MCH RBC QN AUTO: 29.6 PG (ref 26.6–33)
MCHC RBC AUTO-ENTMCNC: 32.1 G/DL (ref 31.5–35.7)
MCV RBC AUTO: 92.5 FL (ref 79–97)
MONOCYTES # BLD AUTO: 0.58 10*3/MM3 (ref 0.1–0.9)
MONOCYTES NFR BLD AUTO: 8.9 % (ref 5–12)
NEUTROPHILS NFR BLD AUTO: 4.03 10*3/MM3 (ref 1.7–7)
NEUTROPHILS NFR BLD AUTO: 61.9 % (ref 42.7–76)
NRBC BLD AUTO-RTO: 0 /100 WBC (ref 0–0.2)
PLATELET # BLD AUTO: 344 10*3/MM3 (ref 140–450)
PMV BLD AUTO: 10.2 FL (ref 6–12)
POTASSIUM SERPL-SCNC: 4.4 MMOL/L (ref 3.5–5.2)
PROT SERPL-MCNC: 7.2 G/DL (ref 6–8.5)
RBC # BLD AUTO: 4.52 10*6/MM3 (ref 3.77–5.28)
SODIUM SERPL-SCNC: 140 MMOL/L (ref 136–145)
TIBC SERPL-MCNC: 340 MCG/DL (ref 298–536)
TRANSFERRIN SERPL-MCNC: 228 MG/DL (ref 200–360)
WBC # BLD AUTO: 6.51 10*3/MM3 (ref 3.4–10.8)

## 2020-10-20 PROCEDURE — 85025 COMPLETE CBC W/AUTO DIFF WBC: CPT | Performed by: NURSE PRACTITIONER

## 2020-10-20 PROCEDURE — 84466 ASSAY OF TRANSFERRIN: CPT | Performed by: NURSE PRACTITIONER

## 2020-10-20 PROCEDURE — 80053 COMPREHEN METABOLIC PANEL: CPT | Performed by: NURSE PRACTITIONER

## 2020-10-20 PROCEDURE — 82728 ASSAY OF FERRITIN: CPT | Performed by: NURSE PRACTITIONER

## 2020-10-20 PROCEDURE — 83540 ASSAY OF IRON: CPT | Performed by: NURSE PRACTITIONER

## 2020-12-17 ENCOUNTER — OFFICE VISIT (OUTPATIENT)
Dept: ONCOLOGY | Facility: CLINIC | Age: 50
End: 2020-12-17

## 2020-12-17 VITALS
WEIGHT: 229.6 LBS | BODY MASS INDEX: 43.38 KG/M2 | DIASTOLIC BLOOD PRESSURE: 84 MMHG | TEMPERATURE: 98.4 F | HEART RATE: 109 BPM | OXYGEN SATURATION: 96 % | RESPIRATION RATE: 18 BRPM | SYSTOLIC BLOOD PRESSURE: 132 MMHG

## 2020-12-17 DIAGNOSIS — K90.9 MALABSORPTION OF IRON: ICD-10-CM

## 2020-12-17 DIAGNOSIS — D50.9 IRON DEFICIENCY ANEMIA, UNSPECIFIED IRON DEFICIENCY ANEMIA TYPE: ICD-10-CM

## 2020-12-17 DIAGNOSIS — E53.8 B12 DEFICIENCY: ICD-10-CM

## 2020-12-17 LAB
ALBUMIN SERPL-MCNC: 4.28 G/DL (ref 3.5–5.2)
ALBUMIN/GLOB SERPL: 1.5 G/DL
ALP SERPL-CCNC: 70 U/L (ref 39–117)
ALT SERPL W P-5'-P-CCNC: 20 U/L (ref 1–33)
ANION GAP SERPL CALCULATED.3IONS-SCNC: 8.6 MMOL/L (ref 5–15)
AST SERPL-CCNC: 21 U/L (ref 1–32)
BASOPHILS # BLD AUTO: 0.07 10*3/MM3 (ref 0–0.2)
BASOPHILS NFR BLD AUTO: 1 % (ref 0–1.5)
BILIRUB SERPL-MCNC: 0.6 MG/DL (ref 0–1.2)
BUN SERPL-MCNC: 12 MG/DL (ref 6–20)
BUN/CREAT SERPL: 18.5 (ref 7–25)
CALCIUM SPEC-SCNC: 9.5 MG/DL (ref 8.6–10.5)
CHLORIDE SERPL-SCNC: 104 MMOL/L (ref 98–107)
CO2 SERPL-SCNC: 25.4 MMOL/L (ref 22–29)
CREAT SERPL-MCNC: 0.65 MG/DL (ref 0.57–1)
DEPRECATED RDW RBC AUTO: 42.1 FL (ref 37–54)
EOSINOPHIL # BLD AUTO: 0.03 10*3/MM3 (ref 0–0.4)
EOSINOPHIL NFR BLD AUTO: 0.4 % (ref 0.3–6.2)
ERYTHROCYTE [DISTWIDTH] IN BLOOD BY AUTOMATED COUNT: 12.8 % (ref 12.3–15.4)
FERRITIN SERPL-MCNC: 53.06 NG/ML (ref 13–150)
GFR SERPL CREATININE-BSD FRML MDRD: 97 ML/MIN/1.73
GLOBULIN UR ELPH-MCNC: 2.9 GM/DL
GLUCOSE SERPL-MCNC: 88 MG/DL (ref 65–99)
HCT VFR BLD AUTO: 43 % (ref 34–46.6)
HGB BLD-MCNC: 14 G/DL (ref 12–15.9)
IMM GRANULOCYTES # BLD AUTO: 0.05 10*3/MM3 (ref 0–0.05)
IMM GRANULOCYTES NFR BLD AUTO: 0.7 % (ref 0–0.5)
IRON 24H UR-MRATE: 71 MCG/DL (ref 37–145)
IRON SATN MFR SERPL: 20 % (ref 20–50)
LYMPHOCYTES # BLD AUTO: 1.89 10*3/MM3 (ref 0.7–3.1)
LYMPHOCYTES NFR BLD AUTO: 28 % (ref 19.6–45.3)
MCH RBC QN AUTO: 29.5 PG (ref 26.6–33)
MCHC RBC AUTO-ENTMCNC: 32.6 G/DL (ref 31.5–35.7)
MCV RBC AUTO: 90.7 FL (ref 79–97)
MONOCYTES # BLD AUTO: 0.59 10*3/MM3 (ref 0.1–0.9)
MONOCYTES NFR BLD AUTO: 8.7 % (ref 5–12)
NEUTROPHILS NFR BLD AUTO: 4.12 10*3/MM3 (ref 1.7–7)
NEUTROPHILS NFR BLD AUTO: 61.2 % (ref 42.7–76)
NRBC BLD AUTO-RTO: 0 /100 WBC (ref 0–0.2)
PLATELET # BLD AUTO: 346 10*3/MM3 (ref 140–450)
PMV BLD AUTO: 10 FL (ref 6–12)
POTASSIUM SERPL-SCNC: 4.4 MMOL/L (ref 3.5–5.2)
PROT SERPL-MCNC: 7.2 G/DL (ref 6–8.5)
RBC # BLD AUTO: 4.74 10*6/MM3 (ref 3.77–5.28)
SODIUM SERPL-SCNC: 138 MMOL/L (ref 136–145)
TIBC SERPL-MCNC: 359 MCG/DL (ref 298–536)
TRANSFERRIN SERPL-MCNC: 241 MG/DL (ref 200–360)
VIT B12 BLD-MCNC: >2000 PG/ML (ref 211–946)
WBC # BLD AUTO: 6.75 10*3/MM3 (ref 3.4–10.8)

## 2020-12-17 PROCEDURE — 99213 OFFICE O/P EST LOW 20 MIN: CPT | Performed by: NURSE PRACTITIONER

## 2020-12-17 PROCEDURE — 84466 ASSAY OF TRANSFERRIN: CPT | Performed by: NURSE PRACTITIONER

## 2020-12-17 PROCEDURE — 80053 COMPREHEN METABOLIC PANEL: CPT | Performed by: NURSE PRACTITIONER

## 2020-12-17 PROCEDURE — 82607 VITAMIN B-12: CPT | Performed by: NURSE PRACTITIONER

## 2020-12-17 PROCEDURE — 82728 ASSAY OF FERRITIN: CPT | Performed by: NURSE PRACTITIONER

## 2020-12-17 PROCEDURE — 83540 ASSAY OF IRON: CPT | Performed by: NURSE PRACTITIONER

## 2020-12-17 PROCEDURE — 85025 COMPLETE CBC W/AUTO DIFF WBC: CPT | Performed by: NURSE PRACTITIONER

## 2020-12-17 NOTE — PROGRESS NOTES
DATE:  12/17/2020    REASON FOR FOLLOW UP: NOLAN    REFERRING PHYSICIAN:  ALINE Vora    CHIEF COMPLAINT:  Follow up of ONLAN    TREATMENT:  1. Oral iron-unable to tolerate due to significant GI SE    2. IV Feraheme      3. SL B12 replacement-started mid-September 2019    HISTORY OF PRESENT ILLNESS:   Kadi Awad is a very pleasant 50 y.o. female who is being seen today at the request of ALINE Vora for evaluation and treatment of NOLAN. Ms. Awad reports following with her PCP as needed and typically has blood testing done every 6 months to a year. She reports she has been struggling with NOLAN over the past year. Otherwise, she was not aware of this before as she doesn't follow up routinely. She says she feels exhausted all the time. She also has shortness of breath on exertion. She previously tried taking oral iron therapy but she says this causes significant nausea and she is unable to tolerate it. At present, she is not taking any oral iron. She says she did receive one IV iron infusion at the end of July per PCP. She denies having menorrhagia. Denies melena, rectal bleeding or hematuria. She has never had a screening colonoscopy. She denies any other complaints today.     INTERVAL HISTORY:  Ms. Awad presents today for follow up of NOLAN. Overall, she reports that she has been doing well since her last visit. She was last replaced with IV Feraheme in January 2020. She denies fatigue, shortness of breath on exertion, chest pain or dizziness. She reports that her menstrual cycles have been regular over the last 4 months. She denies melena, rectal bleeding or hematuria. She denies any other specific complaints today.           PAST MEDICAL HISTORY:  Past Medical History:   Diagnosis Date   • Hypertension        PAST SURGICAL HISTORY:  Past Surgical History:   Procedure Laterality Date   • SPINE SURGERY      steel froy for scoliosis       FAMILY HISTORY:  History reviewed. No pertinent family  history.    SOCIAL HISTORY:  Social History     Socioeconomic History   • Marital status: Single     Spouse name: Not on file   • Number of children: Not on file   • Years of education: Not on file   • Highest education level: Not on file   Tobacco Use   • Smoking status: Never Smoker   • Smokeless tobacco: Never Used   Substance and Sexual Activity   • Alcohol use: No     Frequency: Never   • Drug use: No   • Sexual activity: Defer     MEDICATIONS:  The current medication list was reviewed in the EMR    Current Outpatient Medications:   •  lisinopril-hydrochlorothiazide (PRINZIDE,ZESTORETIC) 20-12.5 MG per tablet, Take 1 tablet by mouth Daily., Disp: , Rfl:   •  loratadine (ALLERGY) 10 MG tablet, Take 10 mg by mouth Daily., Disp: , Rfl:   •  pantoprazole (PROTONIX) 40 MG EC tablet, Take 40 mg by mouth Daily., Disp: , Rfl:   •  vitamin B-12 (CYANOCOBALAMIN) 100 MCG tablet, Take 50 mcg by mouth Daily., Disp: , Rfl:     ALLERGIES:    Allergies   Allergen Reactions   • Minocycline-Acne Care Products Other (See Comments) and Dizziness     headaches       ..ECOG score: 0             REVIEW OF SYSTEMS:    A comprehensive 14 point review of systems was performed.  Significant findings as mentioned above.  All other systems reviewed and are negative.      Physical Exam   Vital Signs: /84   Pulse 109   Temp 98.4 °F (36.9 °C) (Temporal)   Resp 18   Wt 104 kg (229 lb 9.6 oz)   SpO2 96%   BMI 43.38 kg/m²    General: Well developed, well nourished, alert and oriented x 3, in no acute distress.  Head: ATNC   Eyes: PERRL, No evidence of conjunctivitis.   Nose: No nasal discharge.   Mouth: Oral mucosal membranes moist. No oral ulceration or hemorrhages.   Neck: Neck supple. No thyromegaly. No JVD.   Lungs: Clear in all fields to A&P without rales, rhonchi or wheezing.   Heart: S1, S2. No murmurs, rubs, or gallops.   Abdomen: Soft. Bowel sounds are normoactive. Nontender with palpation.    Extremities: No cyanosis or  edema.   Integumentary: Warm, dry, intact.  Neurologic: Grossly non-focal exam    Pain Score:  Pain Score    12/17/20 0900   PainSc: 0-No pain     RECENT LABS:  Lab Results   Component Value Date    WBC 6.75 12/17/2020    HGB 14.0 12/17/2020    HCT 43.0 12/17/2020    MCV 90.7 12/17/2020    RDW 12.8 12/17/2020     12/17/2020    NEUTRORELPCT 61.2 12/17/2020    LYMPHORELPCT 28.0 12/17/2020    MONORELPCT 8.7 12/17/2020    EOSRELPCT 0.4 12/17/2020    BASORELPCT 1.0 12/17/2020    NEUTROABS 4.12 12/17/2020    LYMPHSABS 1.89 12/17/2020       Lab Results   Component Value Date     10/20/2020    K 4.4 10/20/2020    CO2 25.3 10/20/2020     10/20/2020    BUN 13 10/20/2020    CREATININE 0.61 10/20/2020    EGFRIFNONA 105 10/20/2020    GLUCOSE 100 (H) 10/20/2020    CALCIUM 9.5 10/20/2020    ALKPHOS 72 10/20/2020    AST 30 10/20/2020    ALT 30 10/20/2020    BILITOT 0.4 10/20/2020    ALBUMIN 4.26 10/20/2020    PROTEINTOT 7.2 10/20/2020      Lab Results   Component Value Date    FERRITIN 72.40 10/20/2020    IRON 61 10/20/2020    TIBC 340 10/20/2020    LABIRON 18 (L) 10/20/2020    [id number] >2,000 (H) 08/20/2020    FOLATE 17.10 09/17/2019    HAPTOGLOBIN 177 09/17/2019    RETICCTPCT 1.19 09/17/2019    RETIC 0.0516 09/17/2019     Workup 9/17/19    Iron  37 - 145 mcg/dL 13 Abnormally low     Iron Saturation  20 - 50 % 3 Abnormally low     Transferrin  200 - 360 mg/dL 314    TIBC  298 - 536 mcg/dL 468      Ferritin  13.00 - 150.00 ng/mL 3.92 Abnormally low       Vitamin B-12  211 - 946 pg/mL 253      Folate  4.78 - 24.20 ng/mL 17.10      Reticulocyte %  0.70 - 1.90 % 1.19    Reticulocyte Absolute  0.0200 - 0.1300 10*6/mm3 0.0516      LDH  135 - 214 U/L 162      Haptoglobin  30 - 200 mg/dL 177      TSH  0.270 - 4.200 uIU/mL 1.660      Sed Rate  0 - 20 mm/hr 22 Abnormally high       C-Reactive Protein  0.00 - 0.50 mg/dL 0.11      Copper  72 - 166 ug/dL 132      Zinc  56 - 134 ug/dL 59      Endoscopy:    Colonoscopy  02-10-20      Pathology:  02-10-20      ASSESSMENT & PLAN:  Kadi Awad is a very pleasant 50 y.o. female with    1.  Iron deficiency anemia:  -Ms. Awad typically follows with PCP only as needed with blood testing every 6 months to a year. She reports she has been struggling with NOLAN over the past year. Previous available labs reviewed.   -Unfortunately, she is unable to tolerate oral iron due to significant GI SE.  She denies having melena, rectal bleeding or hematuria. Denies menorrhagia.   -Obtained repeat CBC on initial consultation which showed stable Hg 9.3, platelets were elevated (likely reactive to NOLAN). Repeat iron studies remained very low. Therefore, we have been replacing with IV Feraheme as needed.    -She was referred to GI and had EGD done which was reportedly negative for bleeding. However, she says she has a hiatal hernia and was started on a PPI. She was also positive for H. Pylori and completed a course of antibiotics. She underwent colonoscopy on Monday which she says was negative for bleeding but did have 4 polyps removed. Pathology summarized above and was negative for high grade dysplasia or malignancy.   -Also previously checked additional labs to further evaluate anemia including PBS as above. There was no evidence of hemolysis, B12 was low therefore, started replacement as below,  Folate replete, TSH was normal, ESR was mildly elevated and suggestive of underlying inflammation, CRP was normal, Copper and Zinc were normal. Tissue Transglutaminase was normal.   - She was replaced with IV Feraheme in January 2020.  - CBC from today showing normal Hg. Iron panel and ferritin remain normal.  - Will follow up in 4 months with labs and replace with IV Feraheme as needed.   - Advised patient to call if she develops worsening symptoms including weakness/fatigue, chest pain, shortness of breath on exertion or dizziness and we will be happy to check labs sooner.    2. B12 deficiency:  - She is  taking SL B12 2500 mcg daily as advised. B12 now replete.   - B12 level pending from today.  - Will monitor.    3. Thrombocytosis:  -Likely reactive and secondary to NOLAN. Platelet count normalized following replacement with IV iron. Will monitor.     ACO / ANDRE/Other  Quality measures  -  Kadi Awad received 2020 flu vaccine.  -  Kadi Awad reports a pain score of 0.    -  Current outpatient and discharge medications have been reconciled for the patient.  Reviewed by: LEILANI Barboza      The patient was in agreement with the plan and all questions were answered to her satisfaction.     Thank you so much for allowing us to participate in the care of Kadi Awad . Please do not hesitate to contact us with any questions or concerns.     A total of 15 minutes were spent coordinating this patient’s care in clinic today; more than 50% of this time was face-to-face with the patient, reviewing her medical history and counseling on the current treatment and followup plan. All questions were answered to her satisfaction.      Electronically Signed by: LEILANI Newman , December 17, 2020 09:08 EST       CC:   Melodie Mejias PA

## 2021-01-29 ENCOUNTER — IMMUNIZATION (OUTPATIENT)
Dept: VACCINE CLINIC | Facility: HOSPITAL | Age: 51
End: 2021-01-29

## 2021-01-29 PROCEDURE — 91300 HC SARSCOV02 VAC 30MCG/0.3ML IM: CPT | Performed by: FAMILY MEDICINE

## 2021-01-29 PROCEDURE — 0001A: CPT | Performed by: FAMILY MEDICINE

## 2021-02-19 ENCOUNTER — IMMUNIZATION (OUTPATIENT)
Dept: VACCINE CLINIC | Facility: HOSPITAL | Age: 51
End: 2021-02-19

## 2021-02-19 PROCEDURE — 0002A: CPT | Performed by: INTERNAL MEDICINE

## 2021-02-19 PROCEDURE — 91300 HC SARSCOV02 VAC 30MCG/0.3ML IM: CPT | Performed by: INTERNAL MEDICINE

## 2021-04-02 ENCOUNTER — LAB (OUTPATIENT)
Dept: ONCOLOGY | Facility: CLINIC | Age: 51
End: 2021-04-02

## 2021-04-02 ENCOUNTER — OFFICE VISIT (OUTPATIENT)
Dept: ONCOLOGY | Facility: CLINIC | Age: 51
End: 2021-04-02

## 2021-04-02 VITALS
RESPIRATION RATE: 18 BRPM | BODY MASS INDEX: 43.19 KG/M2 | WEIGHT: 228.6 LBS | DIASTOLIC BLOOD PRESSURE: 85 MMHG | OXYGEN SATURATION: 98 % | SYSTOLIC BLOOD PRESSURE: 127 MMHG | TEMPERATURE: 98 F | HEART RATE: 109 BPM

## 2021-04-02 DIAGNOSIS — E53.8 B12 DEFICIENCY: ICD-10-CM

## 2021-04-02 DIAGNOSIS — D50.9 IRON DEFICIENCY ANEMIA, UNSPECIFIED IRON DEFICIENCY ANEMIA TYPE: ICD-10-CM

## 2021-04-02 DIAGNOSIS — D50.9 IRON DEFICIENCY ANEMIA, UNSPECIFIED IRON DEFICIENCY ANEMIA TYPE: Primary | ICD-10-CM

## 2021-04-02 DIAGNOSIS — K90.9 MALABSORPTION OF IRON: ICD-10-CM

## 2021-04-02 LAB
ALBUMIN SERPL-MCNC: 3.99 G/DL (ref 3.5–5.2)
ALBUMIN/GLOB SERPL: 1.4 G/DL
ALP SERPL-CCNC: 66 U/L (ref 39–117)
ALT SERPL W P-5'-P-CCNC: 16 U/L (ref 1–33)
ANION GAP SERPL CALCULATED.3IONS-SCNC: 11.3 MMOL/L (ref 5–15)
AST SERPL-CCNC: 23 U/L (ref 1–32)
BASOPHILS # BLD AUTO: 0.06 10*3/MM3 (ref 0–0.2)
BASOPHILS NFR BLD AUTO: 0.9 % (ref 0–1.5)
BILIRUB SERPL-MCNC: 0.6 MG/DL (ref 0–1.2)
BUN SERPL-MCNC: 11 MG/DL (ref 6–20)
BUN/CREAT SERPL: 17.2 (ref 7–25)
CALCIUM SPEC-SCNC: 9.8 MG/DL (ref 8.6–10.5)
CHLORIDE SERPL-SCNC: 104 MMOL/L (ref 98–107)
CO2 SERPL-SCNC: 24.7 MMOL/L (ref 22–29)
CREAT SERPL-MCNC: 0.64 MG/DL (ref 0.57–1)
DEPRECATED RDW RBC AUTO: 44.3 FL (ref 37–54)
EOSINOPHIL # BLD AUTO: 0.03 10*3/MM3 (ref 0–0.4)
EOSINOPHIL NFR BLD AUTO: 0.4 % (ref 0.3–6.2)
ERYTHROCYTE [DISTWIDTH] IN BLOOD BY AUTOMATED COUNT: 13.3 % (ref 12.3–15.4)
FERRITIN SERPL-MCNC: 43.62 NG/ML (ref 13–150)
GFR SERPL CREATININE-BSD FRML MDRD: 99 ML/MIN/1.73
GLOBULIN UR ELPH-MCNC: 2.9 GM/DL
GLUCOSE SERPL-MCNC: 90 MG/DL (ref 65–99)
HCT VFR BLD AUTO: 42.1 % (ref 34–46.6)
HGB BLD-MCNC: 13.7 G/DL (ref 12–15.9)
IMM GRANULOCYTES # BLD AUTO: 0.05 10*3/MM3 (ref 0–0.05)
IMM GRANULOCYTES NFR BLD AUTO: 0.7 % (ref 0–0.5)
IRON 24H UR-MRATE: 117 MCG/DL (ref 37–145)
IRON SATN MFR SERPL: 32 % (ref 20–50)
LYMPHOCYTES # BLD AUTO: 1.68 10*3/MM3 (ref 0.7–3.1)
LYMPHOCYTES NFR BLD AUTO: 24 % (ref 19.6–45.3)
MCH RBC QN AUTO: 29.6 PG (ref 26.6–33)
MCHC RBC AUTO-ENTMCNC: 32.5 G/DL (ref 31.5–35.7)
MCV RBC AUTO: 90.9 FL (ref 79–97)
MONOCYTES # BLD AUTO: 0.61 10*3/MM3 (ref 0.1–0.9)
MONOCYTES NFR BLD AUTO: 8.7 % (ref 5–12)
NEUTROPHILS NFR BLD AUTO: 4.57 10*3/MM3 (ref 1.7–7)
NEUTROPHILS NFR BLD AUTO: 65.3 % (ref 42.7–76)
NRBC BLD AUTO-RTO: 0 /100 WBC (ref 0–0.2)
PLATELET # BLD AUTO: 370 10*3/MM3 (ref 140–450)
PMV BLD AUTO: 10 FL (ref 6–12)
POTASSIUM SERPL-SCNC: 4.2 MMOL/L (ref 3.5–5.2)
PROT SERPL-MCNC: 6.9 G/DL (ref 6–8.5)
RBC # BLD AUTO: 4.63 10*6/MM3 (ref 3.77–5.28)
SODIUM SERPL-SCNC: 140 MMOL/L (ref 136–145)
TIBC SERPL-MCNC: 367 MCG/DL (ref 298–536)
TRANSFERRIN SERPL-MCNC: 246 MG/DL (ref 200–360)
VIT B12 BLD-MCNC: >2000 PG/ML (ref 211–946)
WBC # BLD AUTO: 7 10*3/MM3 (ref 3.4–10.8)

## 2021-04-02 PROCEDURE — 85025 COMPLETE CBC W/AUTO DIFF WBC: CPT | Performed by: NURSE PRACTITIONER

## 2021-04-02 PROCEDURE — 80053 COMPREHEN METABOLIC PANEL: CPT | Performed by: NURSE PRACTITIONER

## 2021-04-02 PROCEDURE — 83540 ASSAY OF IRON: CPT | Performed by: NURSE PRACTITIONER

## 2021-04-02 PROCEDURE — 82728 ASSAY OF FERRITIN: CPT | Performed by: NURSE PRACTITIONER

## 2021-04-02 PROCEDURE — 82607 VITAMIN B-12: CPT | Performed by: NURSE PRACTITIONER

## 2021-04-02 PROCEDURE — 84466 ASSAY OF TRANSFERRIN: CPT | Performed by: NURSE PRACTITIONER

## 2021-04-02 PROCEDURE — 99213 OFFICE O/P EST LOW 20 MIN: CPT | Performed by: NURSE PRACTITIONER

## 2021-04-02 NOTE — PROGRESS NOTES
DATE:  4/2/2021    REASON FOR FOLLOW UP: NOLAN    REFERRING PHYSICIAN:  ALINE Vora    CHIEF COMPLAINT:  Follow up of NOLAN    TREATMENT:  1. Oral iron-unable to tolerate due to significant GI SE    2. IV Feraheme      3. SL B12 replacement-started mid-September 2019    HISTORY OF PRESENT ILLNESS:   Kadi Awad is a very pleasant 50 y.o. female who is being seen today at the request of ALINE Vora for evaluation and treatment of NOLAN. Ms. Awad reports following with her PCP as needed and typically has blood testing done every 6 months to a year. She reports she has been struggling with NOLAN over the past year. Otherwise, she was not aware of this before as she doesn't follow up routinely. She says she feels exhausted all the time. She also has shortness of breath on exertion. She previously tried taking oral iron therapy but she says this causes significant nausea and she is unable to tolerate it. At present, she is not taking any oral iron. She says she did receive one IV iron infusion at the end of July per PCP. She denies having menorrhagia. Denies melena, rectal bleeding or hematuria. She has never had a screening colonoscopy. She denies any other complaints today.     INTERVAL HISTORY:  Ms. Awad presents today for follow up of NOLAN. Overall, she reports that she has been doing well since her last visit. She was last replaced with IV Feraheme in January 2020. She denies fatigue, shortness of breath on exertion, chest pain or dizziness. She continues to have normal menstrual cycles. She denies melena, rectal bleeding or hematuria. She denies any other specific complaints today.           PAST MEDICAL HISTORY:  Past Medical History:   Diagnosis Date   • Hypertension        PAST SURGICAL HISTORY:  Past Surgical History:   Procedure Laterality Date   • SPINE SURGERY      steel froy for scoliosis       FAMILY HISTORY:  History reviewed. No pertinent family history.    SOCIAL HISTORY:  Social  History     Socioeconomic History   • Marital status: Single     Spouse name: Not on file   • Number of children: Not on file   • Years of education: Not on file   • Highest education level: Not on file   Tobacco Use   • Smoking status: Never Smoker   • Smokeless tobacco: Never Used   Vaping Use   • Vaping Use: Never used   Substance and Sexual Activity   • Alcohol use: No   • Drug use: No   • Sexual activity: Defer     MEDICATIONS:  The current medication list was reviewed in the EMR    Current Outpatient Medications:   •  lisinopril-hydrochlorothiazide (PRINZIDE,ZESTORETIC) 20-12.5 MG per tablet, Take 1 tablet by mouth Daily., Disp: , Rfl:   •  loratadine (ALLERGY) 10 MG tablet, Take 10 mg by mouth Daily., Disp: , Rfl:   •  pantoprazole (PROTONIX) 40 MG EC tablet, Take 40 mg by mouth Daily., Disp: , Rfl:   •  vitamin B-12 (CYANOCOBALAMIN) 100 MCG tablet, Take 50 mcg by mouth Daily., Disp: , Rfl:     ALLERGIES:    Allergies   Allergen Reactions   • Minocycline-Acne Care Products Other (See Comments) and Dizziness     headaches       ..ECOG score: 0     Karnofsky score: 100       REVIEW OF SYSTEMS:    A comprehensive 14 point review of systems was performed.  Significant findings as mentioned above.  All other systems reviewed and are negative.      Physical Exam   Vital Signs: /85   Pulse 109   Temp 98 °F (36.7 °C) (Temporal)   Resp 18   Wt 104 kg (228 lb 9.6 oz)   SpO2 98%   BMI 43.19 kg/m²    General: Well developed, well nourished, alert and oriented x 3, in no acute distress.  Head: ATNC   Eyes: PERRL, No evidence of conjunctivitis.   Nose: No nasal discharge.   Mouth: Oral mucosal membranes moist. No oral ulceration or hemorrhages.   Neck: Neck supple. No thyromegaly. No JVD.   Lungs: Clear in all fields to A&P without rales, rhonchi or wheezing.   Heart: S1, S2. No murmurs, rubs, or gallops.   Abdomen: Soft. Bowel sounds are normoactive. Nontender with palpation.    Extremities: No cyanosis or  edema.   Integumentary: Warm, dry, intact.  Neurologic: Grossly non-focal exam    Pain Score:  Pain Score    04/02/21 0933   PainSc: 0-No pain     RECENT LABS:  Lab Results   Component Value Date    WBC 7.00 04/02/2021    HGB 13.7 04/02/2021    HCT 42.1 04/02/2021    MCV 90.9 04/02/2021    RDW 13.3 04/02/2021     04/02/2021    NEUTRORELPCT 65.3 04/02/2021    LYMPHORELPCT 24.0 04/02/2021    MONORELPCT 8.7 04/02/2021    EOSRELPCT 0.4 04/02/2021    BASORELPCT 0.9 04/02/2021    NEUTROABS 4.57 04/02/2021    LYMPHSABS 1.68 04/02/2021       Lab Results   Component Value Date     04/02/2021    K 4.2 04/02/2021    CO2 24.7 04/02/2021     04/02/2021    BUN 11 04/02/2021    CREATININE 0.64 04/02/2021    EGFRIFNONA 99 04/02/2021    GLUCOSE 90 04/02/2021    CALCIUM 9.8 04/02/2021    ALKPHOS 66 04/02/2021    AST 23 04/02/2021    ALT 16 04/02/2021    BILITOT 0.6 04/02/2021    ALBUMIN 3.99 04/02/2021    PROTEINTOT 6.9 04/02/2021      Lab Results   Component Value Date    FERRITIN 43.62 04/02/2021    IRON 117 04/02/2021    TIBC 367 04/02/2021    LABIRON 32 04/02/2021    FBYGLMVK53 >2,000 (H) 12/17/2020    FOLATE 17.10 09/17/2019    HAPTOGLOBIN 177 09/17/2019    RETICCTPCT 1.19 09/17/2019    RETIC 0.0516 09/17/2019     Workup 9/17/19    Iron  37 - 145 mcg/dL 13 Abnormally low     Iron Saturation  20 - 50 % 3 Abnormally low     Transferrin  200 - 360 mg/dL 314    TIBC  298 - 536 mcg/dL 468      Ferritin  13.00 - 150.00 ng/mL 3.92 Abnormally low       Vitamin B-12  211 - 946 pg/mL 253      Folate  4.78 - 24.20 ng/mL 17.10      Reticulocyte %  0.70 - 1.90 % 1.19    Reticulocyte Absolute  0.0200 - 0.1300 10*6/mm3 0.0516      LDH  135 - 214 U/L 162      Haptoglobin  30 - 200 mg/dL 177      TSH  0.270 - 4.200 uIU/mL 1.660      Sed Rate  0 - 20 mm/hr 22 Abnormally high       C-Reactive Protein  0.00 - 0.50 mg/dL 0.11      Copper  72 - 166 ug/dL 132      Zinc  56 - 134 ug/dL 59      Endoscopy:    Colonoscopy  02-10-20      Pathology:  02-10-20      ASSESSMENT & PLAN:  Kadi Awad is a very pleasant 50 y.o. female with    1.  Iron deficiency anemia:  -Ms. Awad typically follows with PCP only as needed with blood testing every 6 months to a year. She reports she has been struggling with NOLAN over the past year. Previous available labs reviewed.   -Unfortunately, she is unable to tolerate oral iron due to significant GI SE.  She denies having melena, rectal bleeding or hematuria. Denies menorrhagia.   -Obtained repeat CBC on initial consultation which showed stable Hg 9.3, platelets were elevated (likely reactive to NOLAN). Repeat iron studies remained very low. Therefore, we have been replacing with IV Feraheme as needed.    -She was referred to GI and had EGD done which was reportedly negative for bleeding. However, she says she has a hiatal hernia and was started on a PPI. She was also positive for H. Pylori and completed a course of antibiotics. She underwent colonoscopy on Monday which she says was negative for bleeding but did have 4 polyps removed. Pathology summarized above and was negative for high grade dysplasia or malignancy.   -Also previously checked additional labs to further evaluate anemia including PBS as above. There was no evidence of hemolysis, B12 was low therefore, started replacement as below,  Folate replete, TSH was normal, ESR was mildly elevated and suggestive of underlying inflammation, CRP was normal, Copper and Zinc were normal. Tissue Transglutaminase was normal.   - She was last replaced with IV Feraheme in January 2020.  - CBC from today continues to show normalized Hg. Iron panel and ferritin are replete.  - Will follow up in 4 months with labs and replace with IV Feraheme as needed.   - Advised patient to call if she develops worsening symptoms including weakness/fatigue, chest pain, shortness of breath on exertion or dizziness and we will be happy to check labs sooner.    2. B12  deficiency:  - She is taking SL B12 2500 mcg daily as advised. B12 now replete.   - B12 level pending from today.  - Will monitor.    3. Thrombocytosis:  -Likely reactive and secondary to NOLAN. Platelet count normalized following replacement with IV iron. Will monitor.     ACO / ANDRE/Other  Quality measures  -  Kadi Awad received 2020 flu vaccine.  -  Kadi Awad reports a pain score of 0.    -  Current outpatient and discharge medications have been reconciled for the patient.  Reviewed by: LEILANI Barboza      The patient was in agreement with the plan and all questions were answered to her satisfaction.     Thank you so much for allowing us to participate in the care of Kadi Awad . Please do not hesitate to contact us with any questions or concerns.     A total of 20 minutes were spent coordinating this patient’s care in clinic today; more than 50% of this time was face-to-face with the patient, reviewing her medical history and counseling on the current treatment and followup plan. All questions were answered to her satisfaction.      Electronically Signed by: LEILANI Newman , April 2, 2021 10:16 EDT       CC:   Melodie Mejias PA

## 2021-07-27 ENCOUNTER — TELEPHONE (OUTPATIENT)
Dept: ONCOLOGY | Facility: CLINIC | Age: 51
End: 2021-07-27

## 2021-07-27 NOTE — TELEPHONE ENCOUNTER
Caller: PT    Relationship to patient: SELF    Best call back number: [phone] OK TO LEAVE  MSG     Chief complaint: RESCHEDULE APPT    Type of visit: LAB AND F/U     Requested date: 7/29 ANYTIME OR 8/5 IN PM ONLY    If rescheduling, when is the original appointment: 7/30    Additional notes: PT CALLED TO RESCHEDULE APPTS IF THESE DATES DON'T WORK FOR YOU PLZ CALL HER.

## 2021-07-29 ENCOUNTER — LAB (OUTPATIENT)
Dept: ONCOLOGY | Facility: CLINIC | Age: 51
End: 2021-07-29

## 2021-07-29 ENCOUNTER — OFFICE VISIT (OUTPATIENT)
Dept: ONCOLOGY | Facility: CLINIC | Age: 51
End: 2021-07-29

## 2021-07-29 VITALS
TEMPERATURE: 97.7 F | HEART RATE: 104 BPM | BODY MASS INDEX: 42.36 KG/M2 | RESPIRATION RATE: 18 BRPM | WEIGHT: 224.2 LBS | DIASTOLIC BLOOD PRESSURE: 85 MMHG | SYSTOLIC BLOOD PRESSURE: 126 MMHG | OXYGEN SATURATION: 99 %

## 2021-07-29 DIAGNOSIS — D50.9 IRON DEFICIENCY ANEMIA, UNSPECIFIED IRON DEFICIENCY ANEMIA TYPE: ICD-10-CM

## 2021-07-29 DIAGNOSIS — K90.9 MALABSORPTION OF IRON: ICD-10-CM

## 2021-07-29 DIAGNOSIS — D50.9 IRON DEFICIENCY ANEMIA, UNSPECIFIED IRON DEFICIENCY ANEMIA TYPE: Primary | ICD-10-CM

## 2021-07-29 DIAGNOSIS — E53.8 B12 DEFICIENCY: ICD-10-CM

## 2021-07-29 LAB
ALBUMIN SERPL-MCNC: 4.03 G/DL (ref 3.5–5.2)
ALBUMIN/GLOB SERPL: 1.5 G/DL
ALP SERPL-CCNC: 71 U/L (ref 39–117)
ALT SERPL W P-5'-P-CCNC: 20 U/L (ref 1–33)
ANION GAP SERPL CALCULATED.3IONS-SCNC: 10.4 MMOL/L (ref 5–15)
AST SERPL-CCNC: 26 U/L (ref 1–32)
BASOPHILS # BLD AUTO: 0.09 10*3/MM3 (ref 0–0.2)
BASOPHILS NFR BLD AUTO: 1.1 % (ref 0–1.5)
BILIRUB SERPL-MCNC: 0.4 MG/DL (ref 0–1.2)
BUN SERPL-MCNC: 11 MG/DL (ref 6–20)
BUN/CREAT SERPL: 20.8 (ref 7–25)
CALCIUM SPEC-SCNC: 9.3 MG/DL (ref 8.6–10.5)
CHLORIDE SERPL-SCNC: 106 MMOL/L (ref 98–107)
CO2 SERPL-SCNC: 23.6 MMOL/L (ref 22–29)
CREAT SERPL-MCNC: 0.53 MG/DL (ref 0.57–1)
DEPRECATED RDW RBC AUTO: 42.6 FL (ref 37–54)
EOSINOPHIL # BLD AUTO: 0.05 10*3/MM3 (ref 0–0.4)
EOSINOPHIL NFR BLD AUTO: 0.6 % (ref 0.3–6.2)
ERYTHROCYTE [DISTWIDTH] IN BLOOD BY AUTOMATED COUNT: 12.8 % (ref 12.3–15.4)
FERRITIN SERPL-MCNC: 64.76 NG/ML (ref 13–150)
GFR SERPL CREATININE-BSD FRML MDRD: 123 ML/MIN/1.73
GLOBULIN UR ELPH-MCNC: 2.8 GM/DL
GLUCOSE SERPL-MCNC: 101 MG/DL (ref 65–99)
HCT VFR BLD AUTO: 40 % (ref 34–46.6)
HGB BLD-MCNC: 13.2 G/DL (ref 12–15.9)
IMM GRANULOCYTES # BLD AUTO: 0.05 10*3/MM3 (ref 0–0.05)
IMM GRANULOCYTES NFR BLD AUTO: 0.6 % (ref 0–0.5)
IRON 24H UR-MRATE: 75 MCG/DL (ref 37–145)
IRON SATN MFR SERPL: 22 % (ref 20–50)
LYMPHOCYTES # BLD AUTO: 2.33 10*3/MM3 (ref 0.7–3.1)
LYMPHOCYTES NFR BLD AUTO: 28.2 % (ref 19.6–45.3)
MCH RBC QN AUTO: 30.1 PG (ref 26.6–33)
MCHC RBC AUTO-ENTMCNC: 33 G/DL (ref 31.5–35.7)
MCV RBC AUTO: 91.1 FL (ref 79–97)
MONOCYTES # BLD AUTO: 0.74 10*3/MM3 (ref 0.1–0.9)
MONOCYTES NFR BLD AUTO: 9 % (ref 5–12)
NEUTROPHILS NFR BLD AUTO: 5 10*3/MM3 (ref 1.7–7)
NEUTROPHILS NFR BLD AUTO: 60.5 % (ref 42.7–76)
NRBC BLD AUTO-RTO: 0 /100 WBC (ref 0–0.2)
PLATELET # BLD AUTO: 380 10*3/MM3 (ref 140–450)
PMV BLD AUTO: 10 FL (ref 6–12)
POTASSIUM SERPL-SCNC: 3.8 MMOL/L (ref 3.5–5.2)
PROT SERPL-MCNC: 6.8 G/DL (ref 6–8.5)
RBC # BLD AUTO: 4.39 10*6/MM3 (ref 3.77–5.28)
SODIUM SERPL-SCNC: 140 MMOL/L (ref 136–145)
TIBC SERPL-MCNC: 347 MCG/DL (ref 298–536)
TRANSFERRIN SERPL-MCNC: 233 MG/DL (ref 200–360)
WBC # BLD AUTO: 8.26 10*3/MM3 (ref 3.4–10.8)

## 2021-07-29 PROCEDURE — 99213 OFFICE O/P EST LOW 20 MIN: CPT | Performed by: NURSE PRACTITIONER

## 2021-07-29 PROCEDURE — 82728 ASSAY OF FERRITIN: CPT | Performed by: NURSE PRACTITIONER

## 2021-07-29 PROCEDURE — 83540 ASSAY OF IRON: CPT | Performed by: NURSE PRACTITIONER

## 2021-07-29 PROCEDURE — 84466 ASSAY OF TRANSFERRIN: CPT | Performed by: NURSE PRACTITIONER

## 2021-07-29 PROCEDURE — 82607 VITAMIN B-12: CPT | Performed by: NURSE PRACTITIONER

## 2021-07-29 PROCEDURE — 80053 COMPREHEN METABOLIC PANEL: CPT | Performed by: NURSE PRACTITIONER

## 2021-07-29 PROCEDURE — 85025 COMPLETE CBC W/AUTO DIFF WBC: CPT | Performed by: NURSE PRACTITIONER

## 2021-07-29 NOTE — PROGRESS NOTES
DATE:  7/29/2021    REASON FOR FOLLOW UP: NOLAN    REFERRING PHYSICIAN:  ALINE Vora    CHIEF COMPLAINT:  Follow up of NOLAN    TREATMENT:  1. Oral iron-unable to tolerate due to significant GI SE    2. IV Feraheme      3. SL B12 replacement-started mid-September 2019    HISTORY OF PRESENT ILLNESS:   Kadi Awad is a very pleasant 51 y.o. female who is being seen today at the request of ALINE Vora for evaluation and treatment of NOLAN. Ms. Awad reports following with her PCP as needed and typically has blood testing done every 6 months to a year. She reports she has been struggling with NOLAN over the past year. Otherwise, she was not aware of this before as she doesn't follow up routinely. She says she feels exhausted all the time. She also has shortness of breath on exertion. She previously tried taking oral iron therapy but she says this causes significant nausea and she is unable to tolerate it. At present, she is not taking any oral iron. She says she did receive one IV iron infusion at the end of July per PCP. She denies having menorrhagia. Denies melena, rectal bleeding or hematuria. She has never had a screening colonoscopy. She denies any other complaints today.     INTERVAL HISTORY:  Ms. Awad presents today for follow up of NOLAN. Overall, she reports that she has been doing well since her last visit. She was last replaced with IV Feraheme in January 2020. She denies fatigue, shortness of breath on exertion, chest pain or dizziness. She continues to have normal menstrual cycles. She denies melena, rectal bleeding or hematuria. She denies any other specific complaints today.           PAST MEDICAL HISTORY:  Past Medical History:   Diagnosis Date   • Hypertension        PAST SURGICAL HISTORY:  Past Surgical History:   Procedure Laterality Date   • SPINE SURGERY      steel froy for scoliosis       FAMILY HISTORY:  History reviewed. No pertinent family history.    SOCIAL HISTORY:  Social  History     Socioeconomic History   • Marital status: Single     Spouse name: Not on file   • Number of children: Not on file   • Years of education: Not on file   • Highest education level: Not on file   Tobacco Use   • Smoking status: Never Smoker   • Smokeless tobacco: Never Used   Vaping Use   • Vaping Use: Never used   Substance and Sexual Activity   • Alcohol use: No   • Drug use: No   • Sexual activity: Defer     MEDICATIONS:  The current medication list was reviewed in the EMR    Current Outpatient Medications:   •  lisinopril-hydrochlorothiazide (PRINZIDE,ZESTORETIC) 20-12.5 MG per tablet, Take 1 tablet by mouth Daily., Disp: , Rfl:   •  loratadine (ALLERGY) 10 MG tablet, Take 10 mg by mouth Daily., Disp: , Rfl:   •  pantoprazole (PROTONIX) 40 MG EC tablet, Take 40 mg by mouth Daily., Disp: , Rfl:   •  vitamin B-12 (CYANOCOBALAMIN) 100 MCG tablet, Take 50 mcg by mouth Daily., Disp: , Rfl:     ALLERGIES:    Allergies   Allergen Reactions   • Minocycline-Acne Care Products Other (See Comments) and Dizziness     headaches       ..ECOG score: 0             REVIEW OF SYSTEMS:    A comprehensive 14 point review of systems was performed.  Significant findings as mentioned above.  All other systems reviewed and are negative.      Physical Exam   Vital Signs: /85   Pulse 104   Temp 97.7 °F (36.5 °C) (Temporal)   Resp 18   Wt 102 kg (224 lb 3.2 oz)   LMP 07/19/2021   SpO2 99%   BMI 42.36 kg/m²    General: Well developed, well nourished, alert and oriented x 3, in no acute distress.  Head: ATNC   Eyes: PERRL, No evidence of conjunctivitis.   Nose: No nasal discharge.   Mouth: Oral mucosal membranes moist. No oral ulceration or hemorrhages.   Neck: Neck supple. No thyromegaly. No JVD.   Lungs: Clear in all fields to A&P without rales, rhonchi or wheezing.   Heart: S1, S2. No murmurs, rubs, or gallops.   Abdomen: Soft. Bowel sounds are normoactive. Nontender with palpation.    Extremities: No cyanosis or  edema.   Integumentary: Warm, dry, intact.  Neurologic: Grossly non-focal exam  Unchanged from prior physical exam.    Pain Score:  Pain Score    07/29/21 1522   PainSc: 0-No pain     RECENT LABS:  Lab Results   Component Value Date    WBC 8.26 07/29/2021    HGB 13.2 07/29/2021    HCT 40.0 07/29/2021    MCV 91.1 07/29/2021    RDW 12.8 07/29/2021     07/29/2021    NEUTRORELPCT 60.5 07/29/2021    LYMPHORELPCT 28.2 07/29/2021    MONORELPCT 9.0 07/29/2021    EOSRELPCT 0.6 07/29/2021    BASORELPCT 1.1 07/29/2021    NEUTROABS 5.00 07/29/2021    LYMPHSABS 2.33 07/29/2021       Lab Results   Component Value Date     04/02/2021    K 4.2 04/02/2021    CO2 24.7 04/02/2021     04/02/2021    BUN 11 04/02/2021    CREATININE 0.64 04/02/2021    EGFRIFNONA 99 04/02/2021    GLUCOSE 90 04/02/2021    CALCIUM 9.8 04/02/2021    ALKPHOS 66 04/02/2021    AST 23 04/02/2021    ALT 16 04/02/2021    BILITOT 0.6 04/02/2021    ALBUMIN 3.99 04/02/2021    PROTEINTOT 6.9 04/02/2021      Lab Results   Component Value Date    FERRITIN 43.62 04/02/2021    IRON 117 04/02/2021    TIBC 367 04/02/2021    LABIRON 32 04/02/2021    [id number] >2,000 (H) 04/02/2021    FOLATE 17.10 09/17/2019    HAPTOGLOBIN 177 09/17/2019    RETICCTPCT 1.19 09/17/2019    RETIC 0.0516 09/17/2019     Workup 9/17/19    Iron  37 - 145 mcg/dL 13 Abnormally low     Iron Saturation  20 - 50 % 3 Abnormally low     Transferrin  200 - 360 mg/dL 314    TIBC  298 - 536 mcg/dL 468      Ferritin  13.00 - 150.00 ng/mL 3.92 Abnormally low       Vitamin B-12  211 - 946 pg/mL 253      Folate  4.78 - 24.20 ng/mL 17.10      Reticulocyte %  0.70 - 1.90 % 1.19    Reticulocyte Absolute  0.0200 - 0.1300 10*6/mm3 0.0516      LDH  135 - 214 U/L 162      Haptoglobin  30 - 200 mg/dL 177      TSH  0.270 - 4.200 uIU/mL 1.660      Sed Rate  0 - 20 mm/hr 22 Abnormally high       C-Reactive Protein  0.00 - 0.50 mg/dL 0.11      Copper  72 - 166 ug/dL 132      Zinc  56 - 134 ug/dL 59       Endoscopy:    Colonoscopy 02-10-20      Pathology:  02-10-20      ASSESSMENT & PLAN:  Kadi Awad is a very pleasant 51 y.o. female with    1.  Iron deficiency anemia:  - Ms. Awad typically follows with PCP only as needed with blood testing every 6 months to a year. She reports she has been struggling with NOLAN over the past year. Previous available labs reviewed.   - Unfortunately, she is unable to tolerate oral iron due to significant GI SE.  She denies having melena, rectal bleeding or hematuria. Denies menorrhagia.   - Obtained repeat CBC on initial consultation which showed stable Hg 9.3, platelets were elevated (likely reactive to NOLAN). Repeat iron studies remained very low. Therefore, we have been replacing with IV Feraheme as needed.    - She was referred to GI and had EGD done which was reportedly negative for bleeding. However, she says she has a hiatal hernia and was started on a PPI. She was also positive for H. Pylori and completed a course of antibiotics. She underwent colonoscopy on Monday which she says was negative for bleeding but did have 4 polyps removed. Pathology summarized above and was negative for high grade dysplasia or malignancy.   - Also previously checked additional labs to further evaluate anemia including PBS as above. There was no evidence of hemolysis, B12 was low therefore, started replacement as below,  Folate replete, TSH was normal, ESR was mildly elevated and suggestive of underlying inflammation, CRP was normal, Copper and Zinc were normal. Tissue Transglutaminase was normal.   - She was last replaced with IV Feraheme in January 2020.  - CBC from today continues to show normalized Hg. Iron panel and ferritin are replete.  - Will follow up in 6 months with labs and replace with IV Feraheme as needed.   - Advised patient to call if she develops worsening symptoms including weakness/fatigue, chest pain, shortness of breath on exertion or dizziness and we will be happy to  check labs sooner.    2. B12 deficiency:  - She is taking SL B12 2500 mcg daily as advised. B12 now replete.   - B12 level pending from today.  - Will monitor.    3. Thrombocytosis:  -Likely reactive and secondary to NOLAN. Platelet count normalized following replacement with IV iron. Will monitor.     ACO / ANDRE/Other  Quality measures  -  Kadi Awad received 2020 flu vaccine.  -  Kadi Awad reports a pain score of 0.    -  Current outpatient and discharge medications have been reconciled for the patient.  Reviewed by: LEILANI Barboza      The patient was in agreement with the plan and all questions were answered to her satisfaction.     Thank you so much for allowing us to participate in the care of Kadi Awad . Please do not hesitate to contact us with any questions or concerns.     A total of 20 minutes were spent coordinating this patient’s care in clinic today; more than 50% of this time was face-to-face with the patient, reviewing her medical history and counseling on the current treatment and followup plan. All questions were answered to her satisfaction.      Electronically Signed by: LEILANI Newman , July 29, 2021 15:43 EDT       CC:   Melodie Mejias PA

## 2021-07-30 LAB — VIT B12 BLD-MCNC: >2000 PG/ML (ref 211–946)

## 2022-02-09 ENCOUNTER — LAB (OUTPATIENT)
Dept: ONCOLOGY | Facility: CLINIC | Age: 52
End: 2022-02-09

## 2022-02-09 ENCOUNTER — OFFICE VISIT (OUTPATIENT)
Dept: ONCOLOGY | Facility: CLINIC | Age: 52
End: 2022-02-09

## 2022-02-09 VITALS
TEMPERATURE: 97.7 F | WEIGHT: 227 LBS | DIASTOLIC BLOOD PRESSURE: 84 MMHG | OXYGEN SATURATION: 98 % | SYSTOLIC BLOOD PRESSURE: 125 MMHG | BODY MASS INDEX: 42.89 KG/M2 | RESPIRATION RATE: 18 BRPM | HEART RATE: 112 BPM

## 2022-02-09 DIAGNOSIS — D50.9 IRON DEFICIENCY ANEMIA, UNSPECIFIED IRON DEFICIENCY ANEMIA TYPE: ICD-10-CM

## 2022-02-09 DIAGNOSIS — K90.9 MALABSORPTION OF IRON: ICD-10-CM

## 2022-02-09 DIAGNOSIS — D50.9 IRON DEFICIENCY ANEMIA, UNSPECIFIED IRON DEFICIENCY ANEMIA TYPE: Primary | ICD-10-CM

## 2022-02-09 DIAGNOSIS — E53.8 B12 DEFICIENCY: ICD-10-CM

## 2022-02-09 LAB
BASOPHILS # BLD AUTO: 0.05 10*3/MM3 (ref 0–0.2)
BASOPHILS NFR BLD AUTO: 0.7 % (ref 0–1.5)
DEPRECATED RDW RBC AUTO: 43.4 FL (ref 37–54)
EOSINOPHIL # BLD AUTO: 0.02 10*3/MM3 (ref 0–0.4)
EOSINOPHIL NFR BLD AUTO: 0.3 % (ref 0.3–6.2)
ERYTHROCYTE [DISTWIDTH] IN BLOOD BY AUTOMATED COUNT: 13.3 % (ref 12.3–15.4)
FERRITIN SERPL-MCNC: 30.01 NG/ML (ref 13–150)
HCT VFR BLD AUTO: 42.6 % (ref 34–46.6)
HGB BLD-MCNC: 13.8 G/DL (ref 12–15.9)
IMM GRANULOCYTES # BLD AUTO: 0.06 10*3/MM3 (ref 0–0.05)
IMM GRANULOCYTES NFR BLD AUTO: 0.9 % (ref 0–0.5)
IRON 24H UR-MRATE: 101 MCG/DL (ref 37–145)
IRON SATN MFR SERPL: 26 % (ref 20–50)
LYMPHOCYTES # BLD AUTO: 1.6 10*3/MM3 (ref 0.7–3.1)
LYMPHOCYTES NFR BLD AUTO: 23.5 % (ref 19.6–45.3)
MCH RBC QN AUTO: 28.7 PG (ref 26.6–33)
MCHC RBC AUTO-ENTMCNC: 32.4 G/DL (ref 31.5–35.7)
MCV RBC AUTO: 88.6 FL (ref 79–97)
MONOCYTES # BLD AUTO: 0.57 10*3/MM3 (ref 0.1–0.9)
MONOCYTES NFR BLD AUTO: 8.4 % (ref 5–12)
NEUTROPHILS NFR BLD AUTO: 4.52 10*3/MM3 (ref 1.7–7)
NEUTROPHILS NFR BLD AUTO: 66.2 % (ref 42.7–76)
NRBC BLD AUTO-RTO: 0 /100 WBC (ref 0–0.2)
PLATELET # BLD AUTO: 425 10*3/MM3 (ref 140–450)
PMV BLD AUTO: 9.8 FL (ref 6–12)
RBC # BLD AUTO: 4.81 10*6/MM3 (ref 3.77–5.28)
TIBC SERPL-MCNC: 392 MCG/DL (ref 298–536)
TRANSFERRIN SERPL-MCNC: 263 MG/DL (ref 200–360)
VIT B12 BLD-MCNC: >2000 PG/ML (ref 211–946)
WBC NRBC COR # BLD: 6.82 10*3/MM3 (ref 3.4–10.8)

## 2022-02-09 PROCEDURE — 85025 COMPLETE CBC W/AUTO DIFF WBC: CPT | Performed by: NURSE PRACTITIONER

## 2022-02-09 PROCEDURE — 99213 OFFICE O/P EST LOW 20 MIN: CPT | Performed by: NURSE PRACTITIONER

## 2022-02-09 PROCEDURE — 84466 ASSAY OF TRANSFERRIN: CPT | Performed by: NURSE PRACTITIONER

## 2022-02-09 PROCEDURE — 83540 ASSAY OF IRON: CPT | Performed by: NURSE PRACTITIONER

## 2022-02-09 PROCEDURE — 82728 ASSAY OF FERRITIN: CPT | Performed by: NURSE PRACTITIONER

## 2022-02-09 PROCEDURE — 82607 VITAMIN B-12: CPT | Performed by: NURSE PRACTITIONER

## 2022-02-09 NOTE — PROGRESS NOTES
DATE:  2/9/2022    REASON FOR FOLLOW UP: NOLAN    REFERRING PHYSICIAN:  ALINE Vora    CHIEF COMPLAINT:  Follow up of NOLAN    TREATMENT:  1. Oral iron-unable to tolerate due to significant GI SE    2. IV Feraheme      3. SL B12 replacement-started mid-September 2019    HISTORY OF PRESENT ILLNESS:   Kadi Awad is a very pleasant 51 y.o. female who is being seen today at the request of ALINE Vora for evaluation and treatment of NOLAN. Ms. Awad reports following with her PCP as needed and typically has blood testing done every 6 months to a year. She reports she has been struggling with NOLAN over the past year. Otherwise, she was not aware of this before as she doesn't follow up routinely. She says she feels exhausted all the time. She also has shortness of breath on exertion. She previously tried taking oral iron therapy but she says this causes significant nausea and she is unable to tolerate it. At present, she is not taking any oral iron. She says she did receive one IV iron infusion at the end of July per PCP. She denies having menorrhagia. Denies melena, rectal bleeding or hematuria. She has never had a screening colonoscopy. She denies any other complaints today.     INTERVAL HISTORY:  Ms. Awad presents today for follow up of NOLAN. Overall, she reports that she has been doing well since her last visit. She was last replaced with IV Feraheme in January 2020. She denies fatigue, shortness of breath on exertion, chest pain or dizziness. She continues to have normal menstrual cycles. She denies melena, rectal bleeding or hematuria. She denies any other specific complaints today.           PAST MEDICAL HISTORY:  Past Medical History:   Diagnosis Date   • Hypertension        PAST SURGICAL HISTORY:  Past Surgical History:   Procedure Laterality Date   • SPINE SURGERY      steel froy for scoliosis       FAMILY HISTORY:  History reviewed. No pertinent family history.    SOCIAL HISTORY:  Social  History     Socioeconomic History   • Marital status: Single   Tobacco Use   • Smoking status: Never Smoker   • Smokeless tobacco: Never Used   Vaping Use   • Vaping Use: Never used   Substance and Sexual Activity   • Alcohol use: No   • Drug use: No   • Sexual activity: Defer     MEDICATIONS:  The current medication list was reviewed in the EMR    Current Outpatient Medications:   •  lisinopril-hydrochlorothiazide (PRINZIDE,ZESTORETIC) 20-12.5 MG per tablet, Take 1 tablet by mouth Daily., Disp: , Rfl:   •  loratadine (ALLERGY) 10 MG tablet, Take 10 mg by mouth Daily., Disp: , Rfl:   •  pantoprazole (PROTONIX) 40 MG EC tablet, Take 40 mg by mouth Daily., Disp: , Rfl:   •  vitamin B-12 (CYANOCOBALAMIN) 100 MCG tablet, Take 50 mcg by mouth Daily., Disp: , Rfl:     ALLERGIES:    Allergies   Allergen Reactions   • Minocycline-Acne Care Products Other (See Comments) and Dizziness     headaches             REVIEW OF SYSTEMS:    A comprehensive 14 point review of systems was performed.  Significant findings as mentioned above.  All other systems reviewed and are negative.      Physical Exam   Vital Signs: /84   Pulse 112   Temp 97.7 °F (36.5 °C) (Temporal)   Resp 18   Wt 103 kg (227 lb)   SpO2 98%   BMI 42.89 kg/m²      ECOG score: 1     Karnofsky score: 90   General: Well developed, well nourished, alert and oriented x 3, in no acute distress.  Head: ATNC   Eyes: PERRL, No evidence of conjunctivitis.   Nose: No nasal discharge.   Mouth: Oral mucosal membranes moist. No oral ulceration or hemorrhages.   Neck: Neck supple. No thyromegaly. No JVD.   Lungs: Clear in all fields to A&P without rales, rhonchi or wheezing.   Heart: S1, S2. No murmurs, rubs, or gallops.   Abdomen: Soft. Bowel sounds are normoactive. Nontender with palpation.    Extremities: No cyanosis or edema.   Integumentary: Warm, dry, intact.  Neurologic: Grossly non-focal exam  Unchanged from prior physical exam.    Pain Score:  Pain Score     02/09/22 1007   PainSc: 0-No pain     RECENT LABS:  Lab Results   Component Value Date    WBC 6.82 02/09/2022    HGB 13.8 02/09/2022    HCT 42.6 02/09/2022    MCV 88.6 02/09/2022    RDW 13.3 02/09/2022     02/09/2022    NEUTRORELPCT 66.2 02/09/2022    LYMPHORELPCT 23.5 02/09/2022    MONORELPCT 8.4 02/09/2022    EOSRELPCT 0.3 02/09/2022    BASORELPCT 0.7 02/09/2022    NEUTROABS 4.52 02/09/2022    LYMPHSABS 1.60 02/09/2022       Lab Results   Component Value Date     07/29/2021    K 3.8 07/29/2021    CO2 23.6 07/29/2021     07/29/2021    BUN 11 07/29/2021    CREATININE 0.53 (L) 07/29/2021    EGFRIFNONA 123 07/29/2021    GLUCOSE 101 (H) 07/29/2021    CALCIUM 9.3 07/29/2021    ALKPHOS 71 07/29/2021    AST 26 07/29/2021    ALT 20 07/29/2021    BILITOT 0.4 07/29/2021    ALBUMIN 4.03 07/29/2021    PROTEINTOT 6.8 07/29/2021      Lab Results   Component Value Date    FERRITIN 30.01 02/09/2022    IRON 101 02/09/2022    TIBC 392 02/09/2022    LABIRON 26 02/09/2022    BMWZLYTW03 >2,000 (H) 07/29/2021    FOLATE 17.10 09/17/2019    HAPTOGLOBIN 177 09/17/2019    RETICCTPCT 1.19 09/17/2019    RETIC 0.0516 09/17/2019     Workup 9/17/19    Iron  37 - 145 mcg/dL 13 Abnormally low     Iron Saturation  20 - 50 % 3 Abnormally low     Transferrin  200 - 360 mg/dL 314    TIBC  298 - 536 mcg/dL 468      Ferritin  13.00 - 150.00 ng/mL 3.92 Abnormally low       Vitamin B-12  211 - 946 pg/mL 253      Folate  4.78 - 24.20 ng/mL 17.10      Reticulocyte %  0.70 - 1.90 % 1.19    Reticulocyte Absolute  0.0200 - 0.1300 10*6/mm3 0.0516      LDH  135 - 214 U/L 162      Haptoglobin  30 - 200 mg/dL 177      TSH  0.270 - 4.200 uIU/mL 1.660      Sed Rate  0 - 20 mm/hr 22 Abnormally high       C-Reactive Protein  0.00 - 0.50 mg/dL 0.11      Copper  72 - 166 ug/dL 132      Zinc  56 - 134 ug/dL 59      Endoscopy:    Colonoscopy 02-10-20      Pathology:  02-10-20      ASSESSMENT & PLAN:  Kadi Awad is a very pleasant 51 y.o. female  with    1.  Iron deficiency anemia:  - Ms. Awad typically follows with PCP only as needed with blood testing every 6 months to a year. She reports she has been struggling with NOLAN over the past year. Previous available labs reviewed.   - Unfortunately, she is unable to tolerate oral iron due to significant GI SE.  She denies having melena, rectal bleeding or hematuria. Denies menorrhagia.   - Obtained repeat CBC on initial consultation which showed stable Hg 9.3, platelets were elevated (likely reactive to NOLAN). Repeat iron studies remained very low. Therefore, we have been replacing with IV Feraheme as needed.    - She was referred to GI and had EGD done which was reportedly negative for bleeding. However, she says she has a hiatal hernia and was started on a PPI. She was also positive for H. Pylori and completed a course of antibiotics. She underwent colonoscopy on Monday which she says was negative for bleeding but did have 4 polyps removed. Pathology summarized above and was negative for high grade dysplasia or malignancy.   - Also previously checked additional labs to further evaluate anemia including PBS as above. There was no evidence of hemolysis, B12 was low therefore, started replacement as below,  Folate replete, TSH was normal, ESR was mildly elevated and suggestive of underlying inflammation, CRP was normal, Copper and Zinc were normal. Tissue Transglutaminase was normal.   - She was last replaced with IV Feraheme in January 2020.  - CBC from today continues to show normalized Hg. Iron panel and ferritin are replete.  - Will follow up in 1 year with labs and replace with IV Feraheme as needed.   - Advised patient to call if she develops worsening symptoms including weakness/fatigue, chest pain, shortness of breath on exertion or dizziness and we will be happy to check labs sooner.    2. B12 deficiency:  - She is taking SL B12 2500 mcg daily as advised. B12 now replete.   - B12 level pending from  today.  - Will monitor.    3. Thrombocytosis:  -Likely reactive and secondary to NOLAN. Platelet count normalized following replacement with IV iron. Will monitor.     ACO / ANDRE/Other  Quality measures  -  Kadi Awad received 2021 flu vaccine.  -  Kadi Awad reports a pain score of 0.   -  Current outpatient and discharge medications have been reconciled for the patient.  Reviewed by: LEILANI Barboza      The patient was in agreement with the plan and all questions were answered to her satisfaction.     Thank you so much for allowing us to participate in the care of Kadi Awad . Please do not hesitate to contact us with any questions or concerns.     A total of 20 minutes were spent coordinating this patient’s care in clinic today; more than 50% of this time was face-to-face with the patient, reviewing her medical history and counseling on the current treatment and followup plan. All questions were answered to her satisfaction.      Electronically Signed by: LEILANI Newman , February 9, 2022 11:48 EST       CC:   Melodie Mejias PA

## 2022-03-07 ENCOUNTER — TRANSCRIBE ORDERS (OUTPATIENT)
Dept: ADMINISTRATIVE | Facility: HOSPITAL | Age: 52
End: 2022-03-07

## 2022-03-07 DIAGNOSIS — M79.606 PAIN OF LOWER EXTREMITY, UNSPECIFIED LATERALITY: Primary | ICD-10-CM

## 2022-03-28 ENCOUNTER — HOSPITAL ENCOUNTER (OUTPATIENT)
Dept: CARDIOLOGY | Facility: HOSPITAL | Age: 52
Discharge: HOME | End: 2022-03-28
Admitting: PHYSICIAN ASSISTANT

## 2022-03-28 DIAGNOSIS — M79.606 PAIN OF LOWER EXTREMITY, UNSPECIFIED LATERALITY: ICD-10-CM

## 2022-03-28 PROCEDURE — 93970 EXTREMITY STUDY: CPT | Performed by: RADIOLOGY

## 2022-03-28 PROCEDURE — 93970 EXTREMITY STUDY: CPT

## 2022-04-11 ENCOUNTER — HOSPITAL ENCOUNTER (OUTPATIENT)
Dept: MAMMOGRAPHY | Facility: HOSPITAL | Age: 52
Discharge: HOME | End: 2022-04-11
Admitting: PHYSICIAN ASSISTANT

## 2022-04-11 DIAGNOSIS — Z12.31 VISIT FOR SCREENING MAMMOGRAM: ICD-10-CM

## 2022-04-11 PROCEDURE — 77067 SCR MAMMO BI INCL CAD: CPT

## 2022-04-11 PROCEDURE — 77067 SCR MAMMO BI INCL CAD: CPT | Performed by: RADIOLOGY

## 2022-04-11 PROCEDURE — 77063 BREAST TOMOSYNTHESIS BI: CPT

## 2022-04-11 PROCEDURE — 77063 BREAST TOMOSYNTHESIS BI: CPT | Performed by: RADIOLOGY

## 2022-05-03 ENCOUNTER — HOSPITAL ENCOUNTER (OUTPATIENT)
Dept: ULTRASOUND IMAGING | Facility: HOSPITAL | Age: 52
Discharge: HOME | End: 2022-05-03

## 2022-05-03 ENCOUNTER — HOSPITAL ENCOUNTER (OUTPATIENT)
Dept: MAMMOGRAPHY | Facility: HOSPITAL | Age: 52
Discharge: HOME | End: 2022-05-03

## 2022-05-03 DIAGNOSIS — R92.8 ABNORMAL MAMMOGRAM: ICD-10-CM

## 2022-05-03 PROCEDURE — G0279 TOMOSYNTHESIS, MAMMO: HCPCS

## 2022-05-03 PROCEDURE — 77062 BREAST TOMOSYNTHESIS BI: CPT | Performed by: RADIOLOGY

## 2022-05-03 PROCEDURE — 77066 DX MAMMO INCL CAD BI: CPT | Performed by: RADIOLOGY

## 2022-05-03 PROCEDURE — 77066 DX MAMMO INCL CAD BI: CPT

## 2022-05-11 ENCOUNTER — HOSPITAL ENCOUNTER (OUTPATIENT)
Dept: MAMMOGRAPHY | Facility: HOSPITAL | Age: 52
Discharge: HOME | End: 2022-05-11

## 2022-05-11 DIAGNOSIS — R92.8 ABNORMAL MAMMOGRAM: ICD-10-CM

## 2022-05-11 PROCEDURE — 19081 BX BREAST 1ST LESION STRTCTC: CPT | Performed by: RADIOLOGY

## 2022-05-11 PROCEDURE — 77065 DX MAMMO INCL CAD UNI: CPT | Performed by: RADIOLOGY

## 2022-05-11 PROCEDURE — A4648 IMPLANTABLE TISSUE MARKER: HCPCS

## 2022-05-13 LAB — REF LAB TEST METHOD: NORMAL

## 2022-05-17 ENCOUNTER — TELEPHONE (OUTPATIENT)
Dept: MAMMOGRAPHY | Facility: HOSPITAL | Age: 52
End: 2022-05-17

## 2022-05-17 NOTE — TELEPHONE ENCOUNTER
Attempted to contact pt today regarding results. Left V/M for pt to return call for breast biopsy results and consult information    ----- Message from Sofia Pate MD sent at 5/16/2022 10:40 AM EDT -----  PATHOLOGY:    ALH involving fibroadenoma with associated calcifications. Negative for atypical ductal hyperplasia, DCIS, or invasive carcinoma.    The pathology result is not concordant with the imaging findings. Recommend surgical consultation and excisional biopsy.    The patient will be notified of the results and recommendations by our breast care nurse.

## 2022-05-27 ENCOUNTER — LAB (OUTPATIENT)
Dept: LAB | Facility: HOSPITAL | Age: 52
End: 2022-05-27

## 2022-05-27 ENCOUNTER — TELEPHONE (OUTPATIENT)
Dept: SURGERY | Facility: CLINIC | Age: 52
End: 2022-05-27

## 2022-05-27 ENCOUNTER — OFFICE VISIT (OUTPATIENT)
Dept: SURGERY | Facility: CLINIC | Age: 52
End: 2022-05-27

## 2022-05-27 ENCOUNTER — PRE-ADMISSION TESTING (OUTPATIENT)
Dept: PREADMISSION TESTING | Facility: HOSPITAL | Age: 52
End: 2022-05-27

## 2022-05-27 VITALS
DIASTOLIC BLOOD PRESSURE: 62 MMHG | HEART RATE: 82 BPM | HEIGHT: 61 IN | SYSTOLIC BLOOD PRESSURE: 118 MMHG | WEIGHT: 229.8 LBS | BODY MASS INDEX: 43.39 KG/M2

## 2022-05-27 DIAGNOSIS — R92.8 ABNORMAL MAMMOGRAM: ICD-10-CM

## 2022-05-27 DIAGNOSIS — R92.8 ABNORMAL MAMMOGRAM: Primary | ICD-10-CM

## 2022-05-27 DIAGNOSIS — Z01.818 PRE-OP TESTING: Primary | ICD-10-CM

## 2022-05-27 DIAGNOSIS — Z01.818 PRE-OP TESTING: ICD-10-CM

## 2022-05-27 LAB
ANION GAP SERPL CALCULATED.3IONS-SCNC: 8.9 MMOL/L (ref 5–15)
BASOPHILS # BLD AUTO: 0.05 10*3/MM3 (ref 0–0.2)
BASOPHILS NFR BLD AUTO: 0.6 % (ref 0–1.5)
BUN SERPL-MCNC: 13 MG/DL (ref 6–20)
BUN/CREAT SERPL: 20.3 (ref 7–25)
CALCIUM SPEC-SCNC: 9.6 MG/DL (ref 8.6–10.5)
CHLORIDE SERPL-SCNC: 105 MMOL/L (ref 98–107)
CO2 SERPL-SCNC: 25.1 MMOL/L (ref 22–29)
CREAT SERPL-MCNC: 0.64 MG/DL (ref 0.57–1)
DEPRECATED RDW RBC AUTO: 43.6 FL (ref 37–54)
EGFRCR SERPLBLD CKD-EPI 2021: 108.5 ML/MIN/1.73
EOSINOPHIL # BLD AUTO: 0.04 10*3/MM3 (ref 0–0.4)
EOSINOPHIL NFR BLD AUTO: 0.5 % (ref 0.3–6.2)
ERYTHROCYTE [DISTWIDTH] IN BLOOD BY AUTOMATED COUNT: 13.4 % (ref 12.3–15.4)
GLUCOSE SERPL-MCNC: 85 MG/DL (ref 65–99)
HCT VFR BLD AUTO: 40 % (ref 34–46.6)
HGB BLD-MCNC: 13.2 G/DL (ref 12–15.9)
IMM GRANULOCYTES # BLD AUTO: 0.06 10*3/MM3 (ref 0–0.05)
IMM GRANULOCYTES NFR BLD AUTO: 0.7 % (ref 0–0.5)
LYMPHOCYTES # BLD AUTO: 2.37 10*3/MM3 (ref 0.7–3.1)
LYMPHOCYTES NFR BLD AUTO: 28.8 % (ref 19.6–45.3)
MCH RBC QN AUTO: 29.4 PG (ref 26.6–33)
MCHC RBC AUTO-ENTMCNC: 33 G/DL (ref 31.5–35.7)
MCV RBC AUTO: 89.1 FL (ref 79–97)
MONOCYTES # BLD AUTO: 0.8 10*3/MM3 (ref 0.1–0.9)
MONOCYTES NFR BLD AUTO: 9.7 % (ref 5–12)
NEUTROPHILS NFR BLD AUTO: 4.92 10*3/MM3 (ref 1.7–7)
NEUTROPHILS NFR BLD AUTO: 59.7 % (ref 42.7–76)
NRBC BLD AUTO-RTO: 0 /100 WBC (ref 0–0.2)
PLATELET # BLD AUTO: 387 10*3/MM3 (ref 140–450)
PMV BLD AUTO: 10.2 FL (ref 6–12)
POTASSIUM SERPL-SCNC: 4.6 MMOL/L (ref 3.5–5.2)
RBC # BLD AUTO: 4.49 10*6/MM3 (ref 3.77–5.28)
SARS-COV-2 RNA PNL SPEC NAA+PROBE: NOT DETECTED
SODIUM SERPL-SCNC: 139 MMOL/L (ref 136–145)
WBC NRBC COR # BLD: 8.24 10*3/MM3 (ref 3.4–10.8)

## 2022-05-27 PROCEDURE — 93005 ELECTROCARDIOGRAM TRACING: CPT

## 2022-05-27 PROCEDURE — 76642 ULTRASOUND BREAST LIMITED: CPT | Performed by: SURGERY

## 2022-05-27 PROCEDURE — 36415 COLL VENOUS BLD VENIPUNCTURE: CPT

## 2022-05-27 PROCEDURE — 99204 OFFICE O/P NEW MOD 45 MIN: CPT | Performed by: SURGERY

## 2022-05-27 PROCEDURE — 85025 COMPLETE CBC W/AUTO DIFF WBC: CPT

## 2022-05-27 PROCEDURE — 93010 ELECTROCARDIOGRAM REPORT: CPT | Performed by: INTERNAL MEDICINE

## 2022-05-27 PROCEDURE — C9803 HOPD COVID-19 SPEC COLLECT: HCPCS

## 2022-05-27 PROCEDURE — U0004 COV-19 TEST NON-CDC HGH THRU: HCPCS

## 2022-05-27 PROCEDURE — 80048 BASIC METABOLIC PNL TOTAL CA: CPT

## 2022-05-27 RX ORDER — CEFAZOLIN SODIUM 2 G/50ML
2 SOLUTION INTRAVENOUS ONCE
Status: CANCELLED | OUTPATIENT
Start: 2022-05-27 | End: 2022-05-27

## 2022-05-27 NOTE — TELEPHONE ENCOUNTER
Now it is 8:30 patient does not have to have NL. Dr. Hoffman's had reviewed and informed Dr. Lucas.

## 2022-05-27 NOTE — PROGRESS NOTES
Subjective   Kadi Awad is a 51 y.o. female here today for pathology review.    History of Present Illness  Ms. Awad was seen in the office today for breast evaluation.  Patient underwent biopsy of left breast calcifications.  Pathology report was felt not to be concordant with imaging findings and surgical biopsy was recommended.  The patient denies a palpable mass or nipple discharge.  There is no prior history of breast biopsy or cyst aspiration.  There is no family history of breast or ovarian cancer.  The patient is perimenopausal.  Imaging/pathology:  4/11/2022-screening mammogram demonstrating bilateral breast calcifications.  5/3/2022 bilateral diagnostic mammogram with tomosynthesis demonstrating probable benign right breast findings with grouped calcifications in the left breast felt to be suspicious.  5/11/2022 left breast stereotactic biopsy.  Marking clip was reported to be in good position.  Pathology demonstrated atypical lobular hyperplasia involving a fibroadenoma with associated calcifications.  Allergies   Allergen Reactions   • Minocycline-Acne Care Products Other (See Comments) and Dizziness     headaches     Current Outpatient Medications   Medication Sig Dispense Refill   • lisinopril-hydrochlorothiazide (PRINZIDE,ZESTORETIC) 20-12.5 MG per tablet Take 1 tablet by mouth Daily.     • loratadine (CLARITIN) 10 MG tablet Take 10 mg by mouth Daily.     • pantoprazole (PROTONIX) 40 MG EC tablet Take 40 mg by mouth Daily.     • vitamin B-12 (CYANOCOBALAMIN) 100 MCG tablet Take 50 mcg by mouth Daily.       No current facility-administered medications for this visit.     Past Medical History:   Diagnosis Date   • GERD (gastroesophageal reflux disease)    • History of transfusion    • Hypertension    • Lymphedema    • Scoliosis      Past Surgical History:   Procedure Laterality Date   • COLONOSCOPY     • ENDOSCOPY     • SPINE SURGERY      steel froy for scoliosis       Pertinent Review of  "Systems:  Respiratory: no shortness of breath  Cardiovascular: no chest pain  Other pertinent:      Objective   /62 (BP Location: Left arm)   Pulse 82   Ht 154.9 cm (61\")   Wt 104 kg (229 lb 12.8 oz)   BMI 43.42 kg/m²   Physical Exam  Well-developed well-nourished female no acute distress  Neck:  No supraclavicular adenopathy  Lungs:  Respiratory effort normal. Auscultation: Clear, without wheezes, rhonchi, rales  Heart:  Regular rate and rhythm, without murmur, gallop, rub.  No pedal edema  Breasts: On visual inspection the breasts are symmetrical.  Examination of the right breast demonstrates no discrete mass, skin change, or axillary adenopathy .  On examination of the left breast there is a small incision site in the left lower outer quadrant.  No palpable mass.  There is no axillary adenopathy.      Procedures   Limited left breast Ultrasound    Indication: Evaluation for ultrasound localization    Description: With use of the 12.5 MHz transducer the area of clinical concern was scanned in multiple planes.    Findings: The biopsy incision is located in the left lower outer quadrant.  Just superior and slightly lateral to this the biopsy clip is identified.    Impression: Area of biopsy amenable to ultrasound localization    Recommendation:  Followup as clinically indicated    Results/Data:  Imaging: Mammogram reports and images from 4/11/2022, 5/3/2022 and 5/11/2022 were reviewed.  On my review concern was raised that the biopsy clip was not in the correct position.  This was discussed with the breast center who reported that when I have interpreted to be the 2 biopsy site as evidenced by an air pocket was infected not the true biopsy site and the clip was intact in good position.  Notes:   Lab:   Other: Pathology report was reviewed.  I agree with the assessment    Assessment & Plan   Left breast stereotactic biopsy demonstrating atypical lobular hyperplasia involving a fibroadenoma with associated " calcifications.  Ideally a breast MRI would be obtained prior to surgical procedure to further evaluate the atypical lobular hyperplasia.  However the patient reports that due to steel rods in her back placed as a child at the [facility] she is not able to have a breast MRI.    Plan: Proceed with left breast excisional biopsy which is amenable to ultrasound localization         Discussion/Summary    Time spent:    Class 3 Severe Obesity (BMI >=40). Obesity-related health conditions include the following: none. Obesity is newly identified. BMI is is above average; BMI management plan is completed. We discussed portion control and increasing exercise.       Future Appointments   Date Time Provider Department Center   6/9/2022  9:05 AM Lauren Lucas MD [facility]         Please note that portions of this note were completed with a voice recognition program.

## 2022-05-27 NOTE — DISCHARGE INSTRUCTIONS

## 2022-05-28 LAB
QT INTERVAL: 376 MS
QTC INTERVAL: 408 MS

## 2022-05-30 NOTE — H&P
Subjective   Kadi Awad is a 51 y.o. female here today for pathology review.    History of Present Illness  Ms. Awad was seen in the office today for breast evaluation.  Patient underwent biopsy of left breast calcifications.  Pathology report was felt not to be concordant with imaging findings and surgical biopsy was recommended.  The patient denies a palpable mass or nipple discharge.  There is no prior history of breast biopsy or cyst aspiration.  There is no family history of breast or ovarian cancer.  The patient is perimenopausal.  Imaging/pathology:  4/11/2022-screening mammogram demonstrating bilateral breast calcifications.  5/3/2022 bilateral diagnostic mammogram with tomosynthesis demonstrating probable benign right breast findings with grouped calcifications in the left breast felt to be suspicious.  5/11/2022 left breast stereotactic biopsy.  Marking clip was reported to be in good position.  Pathology demonstrated atypical lobular hyperplasia involving a fibroadenoma with associated calcifications.  Allergies   Allergen Reactions   • Minocycline-Acne Care Products Other (See Comments) and Dizziness     headaches     Current Outpatient Medications   Medication Sig Dispense Refill   • lisinopril-hydrochlorothiazide (PRINZIDE,ZESTORETIC) 20-12.5 MG per tablet Take 1 tablet by mouth Daily.     • loratadine (CLARITIN) 10 MG tablet Take 10 mg by mouth Daily.     • pantoprazole (PROTONIX) 40 MG EC tablet Take 40 mg by mouth Daily.     • vitamin B-12 (CYANOCOBALAMIN) 100 MCG tablet Take 50 mcg by mouth Daily.       No current facility-administered medications for this visit.     Past Medical History:   Diagnosis Date   • Hypertension      Past Surgical History:   Procedure Laterality Date   • SPINE SURGERY      steel froy for scoliosis       Pertinent Review of Systems:  Respiratory: no shortness of breath  Cardiovascular: no chest pain  Other pertinent:      Objective   /62 (BP Location: Left arm)   " Pulse 82   Ht 154.9 cm (61\")   Wt 104 kg (229 lb 12.8 oz)   BMI 43.42 kg/m²   Physical Exam  Well-developed well-nourished female no acute distress  Neck:  No supraclavicular adenopathy  Lungs:  Respiratory effort normal. Auscultation: Clear, without wheezes, rhonchi, rales  Heart:  Regular rate and rhythm, without murmur, gallop, rub.  No pedal edema  Breasts: On visual inspection the breasts are symmetrical.  Examination of the right breast demonstrates no discrete mass, skin change, or axillary adenopathy .  On examination of the left breast there is a small incision site in the left lower outer quadrant.  No palpable mass.  There is no axillary adenopathy.      Procedures   Limited left breast Ultrasound    Indication: Evaluation for ultrasound localization    Description: With use of the 12.5 MHz transducer the area of clinical concern was scanned in multiple planes.    Findings: The biopsy incision is located in the left lower outer quadrant.  Just superior and slightly lateral to this the biopsy clip is identified.    Impression: Area of biopsy amenable to ultrasound localization    Recommendation:  Followup as clinically indicated    Results/Data:  Imaging: Mammogram reports and images from 4/11/2022, 5/3/2022 and 5/11/2022 were reviewed.  On my review concern was raised that the biopsy clip was not in the correct position.  This was discussed with the breast center who reported that when I have interpreted to be the 2 biopsy site as evidenced by an air pocket was infected not the true biopsy site and the clip was intact in good position.  Notes:   Lab:   Other: Pathology report was reviewed.  I agree with the assessment    Assessment & Plan   Left breast stereotactic biopsy demonstrating atypical lobular hyperplasia involving a fibroadenoma with associated calcifications.  Ideally a breast MRI would be obtained prior to surgical procedure to further evaluate the atypical lobular hyperplasia.  However " the patient reports that due to steel rods in her back placed as a child at the [facility] she is not able to have a breast MRI.    Plan: Proceed with left breast excisional biopsy which is amenable to ultrasound localization         Discussion/Summary    Time spent:    Class 3 Severe Obesity (BMI >=40). Obesity-related health conditions include the following: none. Obesity is newly identified. BMI is is above average; BMI management plan is completed. We discussed portion control and increasing exercise.       No future appointments.      Please note that portions of this note were completed with a voice recognition program.    This document has been electronically signed by Lauren CORNEJO MD on May 30, 2022 17:24 EDT

## 2022-05-30 NOTE — PROGRESS NOTES
Subjective   Kadi Awad is a 51 y.o. female here today for pathology review.    History of Present Illness  Ms. Awad was seen in the office today for breast evaluation.  Patient underwent biopsy of left breast calcifications.  Pathology report was felt not to be concordant with imaging findings and surgical biopsy was recommended.  The patient denies a palpable mass or nipple discharge.  There is no prior history of breast biopsy or cyst aspiration.  There is no family history of breast or ovarian cancer.  The patient is perimenopausal.  Imaging/pathology:  4/11/2022-screening mammogram demonstrating bilateral breast calcifications.  5/3/2022 bilateral diagnostic mammogram with tomosynthesis demonstrating probable benign right breast findings with grouped calcifications in the left breast felt to be suspicious.  5/11/2022 left breast stereotactic biopsy.  Marking clip was reported to be in good position.  Pathology demonstrated atypical lobular hyperplasia involving a fibroadenoma with associated calcifications.  Allergies   Allergen Reactions   • Minocycline-Acne Care Products Other (See Comments) and Dizziness     headaches     Current Outpatient Medications   Medication Sig Dispense Refill   • lisinopril-hydrochlorothiazide (PRINZIDE,ZESTORETIC) 20-12.5 MG per tablet Take 1 tablet by mouth Daily.     • loratadine (CLARITIN) 10 MG tablet Take 10 mg by mouth Daily.     • pantoprazole (PROTONIX) 40 MG EC tablet Take 40 mg by mouth Daily.     • vitamin B-12 (CYANOCOBALAMIN) 100 MCG tablet Take 50 mcg by mouth Daily.       No current facility-administered medications for this visit.     Past Medical History:   Diagnosis Date   • GERD (gastroesophageal reflux disease)    • History of transfusion    • Hypertension    • Lymphedema    • Scoliosis      Past Surgical History:   Procedure Laterality Date   • COLONOSCOPY     • ENDOSCOPY     • SPINE SURGERY      steel froy for scoliosis       Pertinent Review of  "Systems:  Respiratory: no shortness of breath  Cardiovascular: no chest pain  Other pertinent:      Objective   /62 (BP Location: Left arm)   Pulse 82   Ht 154.9 cm (61\")   Wt 104 kg (229 lb 12.8 oz)   BMI 43.42 kg/m²   Physical Exam  Well-developed well-nourished female no acute distress  Neck:  No supraclavicular adenopathy  Lungs:  Respiratory effort normal. Auscultation: Clear, without wheezes, rhonchi, rales  Heart:  Regular rate and rhythm, without murmur, gallop, rub.  No pedal edema  Breasts: On visual inspection the breasts are symmetrical.  Examination of the right breast demonstrates no discrete mass, skin change, or axillary adenopathy .  On examination of the left breast there is a small incision site in the left lower outer quadrant.  No palpable mass.  There is no axillary adenopathy.      Procedures   Limited left breast Ultrasound    Indication: Evaluation for ultrasound localization    Description: With use of the 12.5 MHz transducer the area of clinical concern was scanned in multiple planes.    Findings: The biopsy incision is located in the left lower outer quadrant.  Just superior and slightly lateral to this the biopsy clip is identified.    Impression: Area of biopsy amenable to ultrasound localization    Recommendation:  Followup as clinically indicated    Results/Data:  Imaging: Mammogram reports and images from 4/11/2022, 5/3/2022 and 5/11/2022 were reviewed.  On my review concern was raised that the biopsy clip was not in the correct position.  This was discussed with the breast center who reported that when I have interpreted to be the 2 biopsy site as evidenced by an air pocket was infected not the true biopsy site and the clip was intact in good position.  Notes:   Lab:   Other: Pathology report was reviewed.  I agree with the assessment    Assessment & Plan   Left breast stereotactic biopsy demonstrating atypical lobular hyperplasia involving a fibroadenoma with associated " calcifications.  Ideally a breast MRI would be obtained prior to surgical procedure to further evaluate the atypical lobular hyperplasia.  However the patient reports that due to steel rods in her back placed as a child at the [facility] she is not able to have a breast MRI.    Plan: Proceed with left breast excisional biopsy which is amenable to ultrasound localization         Discussion/Summary    Time spent:    Class 3 Severe Obesity (BMI >=40). Obesity-related health conditions include the following: none. Obesity is newly identified. BMI is is above average; BMI management plan is completed. We discussed portion control and increasing exercise.       Future Appointments   Date Time Provider Department Center   6/9/2022  9:05 AM Lauren Lucas MD [facility]         Please note that portions of this note were completed with a voice recognition program.

## 2022-05-30 NOTE — H&P
Subjective   Kadi Awad is a 51 y.o. female here today for pathology review.    History of Present Illness  Ms. Awad was seen in the office today for breast evaluation.  Patient underwent biopsy of left breast calcifications.  Pathology report was felt not to be concordant with imaging findings and surgical biopsy was recommended.  The patient denies a palpable mass or nipple discharge.  There is no prior history of breast biopsy or cyst aspiration.  There is no family history of breast or ovarian cancer.  The patient is perimenopausal.  Imaging/pathology:  4/11/2022-screening mammogram demonstrating bilateral breast calcifications.  5/3/2022 bilateral diagnostic mammogram with tomosynthesis demonstrating probable benign right breast findings with grouped calcifications in the left breast felt to be suspicious.  5/11/2022 left breast stereotactic biopsy.  Marking clip was reported to be in good position.  Pathology demonstrated atypical lobular hyperplasia involving a fibroadenoma with associated calcifications.  Allergies   Allergen Reactions   • Minocycline-Acne Care Products Other (See Comments) and Dizziness     headaches     Current Outpatient Medications   Medication Sig Dispense Refill   • lisinopril-hydrochlorothiazide (PRINZIDE,ZESTORETIC) 20-12.5 MG per tablet Take 1 tablet by mouth Daily.     • loratadine (CLARITIN) 10 MG tablet Take 10 mg by mouth Daily.     • pantoprazole (PROTONIX) 40 MG EC tablet Take 40 mg by mouth Daily.     • vitamin B-12 (CYANOCOBALAMIN) 100 MCG tablet Take 50 mcg by mouth Daily.       No current facility-administered medications for this visit.     Past Medical History:   Diagnosis Date   • GERD (gastroesophageal reflux disease)    • History of transfusion    • Hypertension    • Lymphedema    • Scoliosis      Past Surgical History:   Procedure Laterality Date   • COLONOSCOPY     • ENDOSCOPY     • SPINE SURGERY      steel froy for scoliosis       Pertinent Review of  "Systems:  Respiratory: no shortness of breath  Cardiovascular: no chest pain  Other pertinent:      Objective   /62 (BP Location: Left arm)   Pulse 82   Ht 154.9 cm (61\")   Wt 104 kg (229 lb 12.8 oz)   BMI 43.42 kg/m²   Physical Exam  Well-developed well-nourished female no acute distress  Neck:  No supraclavicular adenopathy  Lungs:  Respiratory effort normal. Auscultation: Clear, without wheezes, rhonchi, rales  Heart:  Regular rate and rhythm, without murmur, gallop, rub.  No pedal edema  Breasts: On visual inspection the breasts are symmetrical.  Examination of the right breast demonstrates no discrete mass, skin change, or axillary adenopathy .  On examination of the left breast there is a small incision site in the left lower outer quadrant.  No palpable mass.  There is no axillary adenopathy.      Procedures   Limited left breast Ultrasound    Indication: Evaluation for ultrasound localization    Description: With use of the 12.5 MHz transducer the area of clinical concern was scanned in multiple planes.    Findings: The biopsy incision is located in the left lower outer quadrant.  Just superior and slightly lateral to this the biopsy clip is identified.    Impression: Area of biopsy amenable to ultrasound localization    Recommendation:  Followup as clinically indicated    Results/Data:  Imaging: Mammogram reports and images from 4/11/2022, 5/3/2022 and 5/11/2022 were reviewed.  On my review concern was raised that the biopsy clip was not in the correct position.  This was discussed with the breast center who reported that when I have interpreted to be the 2 biopsy site as evidenced by an air pocket was infected not the true biopsy site and the clip was intact in good position.  Notes:   Lab:   Other: Pathology report was reviewed.  I agree with the assessment    Assessment & Plan   Left breast stereotactic biopsy demonstrating atypical lobular hyperplasia involving a fibroadenoma with associated " calcifications.  Ideally a breast MRI would be obtained prior to surgical procedure to further evaluate the atypical lobular hyperplasia.  However the patient reports that due to steel rods in her back placed as a child at the [facility] she is not able to have a breast MRI.    Plan: Proceed with left breast excisional biopsy which is amenable to ultrasound localization         Discussion/Summary    Time spent:    Class 3 Severe Obesity (BMI >=40). Obesity-related health conditions include the following: none. Obesity is newly identified. BMI is is above average; BMI management plan is completed. We discussed portion control and increasing exercise.       Future Appointments   Date Time Provider Department Center   6/9/2022  9:05 AM Lauren Cornejo MD [facility]         Please note that portions of this note were completed with a voice recognition program.    This document has been electronically signed by Lauren CORNEJO MD on May 30, 2022 17:32 EDT

## 2022-05-31 ENCOUNTER — ANESTHESIA EVENT (OUTPATIENT)
Dept: PERIOP | Facility: HOSPITAL | Age: 52
End: 2022-05-31

## 2022-05-31 ENCOUNTER — HOSPITAL ENCOUNTER (OUTPATIENT)
Facility: HOSPITAL | Age: 52
Setting detail: HOSPITAL OUTPATIENT SURGERY
Discharge: HOME | End: 2022-05-31
Attending: SURGERY | Admitting: SURGERY

## 2022-05-31 ENCOUNTER — ANESTHESIA (OUTPATIENT)
Dept: PERIOP | Facility: HOSPITAL | Age: 52
End: 2022-05-31

## 2022-05-31 ENCOUNTER — APPOINTMENT (OUTPATIENT)
Dept: MAMMOGRAPHY | Facility: HOSPITAL | Age: 52
End: 2022-05-31

## 2022-05-31 VITALS
OXYGEN SATURATION: 97 % | BODY MASS INDEX: 43.16 KG/M2 | TEMPERATURE: 97.4 F | HEIGHT: 61 IN | RESPIRATION RATE: 18 BRPM | DIASTOLIC BLOOD PRESSURE: 75 MMHG | HEART RATE: 90 BPM | SYSTOLIC BLOOD PRESSURE: 110 MMHG | WEIGHT: 228.6 LBS

## 2022-05-31 DIAGNOSIS — R92.8 ABNORMAL MAMMOGRAM: ICD-10-CM

## 2022-05-31 LAB
B-HCG UR QL: NEGATIVE
EXPIRATION DATE: NORMAL
INTERNAL NEGATIVE CONTROL: NEGATIVE
INTERNAL POSITIVE CONTROL: POSITIVE
Lab: NORMAL

## 2022-05-31 PROCEDURE — 81025 URINE PREGNANCY TEST: CPT | Performed by: ANESTHESIOLOGY

## 2022-05-31 PROCEDURE — 25010000002 PROPOFOL 10 MG/ML EMULSION: Performed by: NURSE ANESTHETIST, CERTIFIED REGISTERED

## 2022-05-31 PROCEDURE — 19125 EXCISION BREAST LESION: CPT | Performed by: SURGERY

## 2022-05-31 PROCEDURE — 25010000002 ONDANSETRON PER 1 MG: Performed by: NURSE ANESTHETIST, CERTIFIED REGISTERED

## 2022-05-31 PROCEDURE — 25010000002 MIDAZOLAM PER 1 MG: Performed by: NURSE ANESTHETIST, CERTIFIED REGISTERED

## 2022-05-31 PROCEDURE — 25010000002 FENTANYL CITRATE (PF) 50 MCG/ML SOLUTION: Performed by: NURSE ANESTHETIST, CERTIFIED REGISTERED

## 2022-05-31 PROCEDURE — 0 CEFAZOLIN SODIUM-DEXTROSE 2-3 GM-%(50ML) RECONSTITUTED SOLUTION: Performed by: SURGERY

## 2022-05-31 RX ORDER — KETOROLAC TROMETHAMINE 30 MG/ML
30 INJECTION, SOLUTION INTRAMUSCULAR; INTRAVENOUS EVERY 6 HOURS PRN
Status: DISCONTINUED | OUTPATIENT
Start: 2022-05-31 | End: 2022-05-31 | Stop reason: HOSPADM

## 2022-05-31 RX ORDER — SODIUM CHLORIDE 0.9 % (FLUSH) 0.9 %
10 SYRINGE (ML) INJECTION AS NEEDED
Status: DISCONTINUED | OUTPATIENT
Start: 2022-05-31 | End: 2022-05-31 | Stop reason: HOSPADM

## 2022-05-31 RX ORDER — LIDOCAINE HYDROCHLORIDE 20 MG/ML
INJECTION, SOLUTION EPIDURAL; INFILTRATION; INTRACAUDAL; PERINEURAL AS NEEDED
Status: DISCONTINUED | OUTPATIENT
Start: 2022-05-31 | End: 2022-05-31 | Stop reason: SURG

## 2022-05-31 RX ORDER — OXYCODONE HYDROCHLORIDE AND ACETAMINOPHEN 5; 325 MG/1; MG/1
1 TABLET ORAL ONCE AS NEEDED
Status: DISCONTINUED | OUTPATIENT
Start: 2022-05-31 | End: 2022-05-31 | Stop reason: HOSPADM

## 2022-05-31 RX ORDER — MEPERIDINE HYDROCHLORIDE 25 MG/ML
12.5 INJECTION INTRAMUSCULAR; INTRAVENOUS; SUBCUTANEOUS
Status: DISCONTINUED | OUTPATIENT
Start: 2022-05-31 | End: 2022-05-31 | Stop reason: HOSPADM

## 2022-05-31 RX ORDER — SODIUM CHLORIDE, SODIUM LACTATE, POTASSIUM CHLORIDE, CALCIUM CHLORIDE 600; 310; 30; 20 MG/100ML; MG/100ML; MG/100ML; MG/100ML
125 INJECTION, SOLUTION INTRAVENOUS ONCE
Status: COMPLETED | OUTPATIENT
Start: 2022-05-31 | End: 2022-05-31

## 2022-05-31 RX ORDER — VIT C/B6/B5/MAGNESIUM/HERB 173 50-5-6-5MG
500 CAPSULE ORAL DAILY
COMMUNITY

## 2022-05-31 RX ORDER — SODIUM CHLORIDE, SODIUM LACTATE, POTASSIUM CHLORIDE, CALCIUM CHLORIDE 600; 310; 30; 20 MG/100ML; MG/100ML; MG/100ML; MG/100ML
INJECTION, SOLUTION INTRAVENOUS CONTINUOUS PRN
Status: DISCONTINUED | OUTPATIENT
Start: 2022-05-31 | End: 2022-05-31 | Stop reason: SURG

## 2022-05-31 RX ORDER — MIDAZOLAM HYDROCHLORIDE 1 MG/ML
INJECTION INTRAMUSCULAR; INTRAVENOUS AS NEEDED
Status: DISCONTINUED | OUTPATIENT
Start: 2022-05-31 | End: 2022-05-31 | Stop reason: SURG

## 2022-05-31 RX ORDER — FAMOTIDINE 10 MG/ML
INJECTION, SOLUTION INTRAVENOUS AS NEEDED
Status: DISCONTINUED | OUTPATIENT
Start: 2022-05-31 | End: 2022-05-31 | Stop reason: SURG

## 2022-05-31 RX ORDER — ONDANSETRON 2 MG/ML
4 INJECTION INTRAMUSCULAR; INTRAVENOUS AS NEEDED
Status: DISCONTINUED | OUTPATIENT
Start: 2022-05-31 | End: 2022-05-31 | Stop reason: HOSPADM

## 2022-05-31 RX ORDER — MAGNESIUM HYDROXIDE 1200 MG/15ML
LIQUID ORAL AS NEEDED
Status: DISCONTINUED | OUTPATIENT
Start: 2022-05-31 | End: 2022-05-31 | Stop reason: HOSPADM

## 2022-05-31 RX ORDER — IPRATROPIUM BROMIDE AND ALBUTEROL SULFATE 2.5; .5 MG/3ML; MG/3ML
3 SOLUTION RESPIRATORY (INHALATION) ONCE AS NEEDED
Status: DISCONTINUED | OUTPATIENT
Start: 2022-05-31 | End: 2022-05-31 | Stop reason: HOSPADM

## 2022-05-31 RX ORDER — MIDAZOLAM HYDROCHLORIDE 1 MG/ML
1 INJECTION INTRAMUSCULAR; INTRAVENOUS
Status: DISCONTINUED | OUTPATIENT
Start: 2022-05-31 | End: 2022-05-31 | Stop reason: HOSPADM

## 2022-05-31 RX ORDER — HYDROCODONE BITARTRATE AND ACETAMINOPHEN 7.5; 325 MG/1; MG/1
1 TABLET ORAL 4 TIMES DAILY PRN
Qty: 8 TABLET | Refills: 0 | Status: SHIPPED | OUTPATIENT
Start: 2022-05-31

## 2022-05-31 RX ORDER — FENTANYL CITRATE 50 UG/ML
INJECTION, SOLUTION INTRAMUSCULAR; INTRAVENOUS AS NEEDED
Status: DISCONTINUED | OUTPATIENT
Start: 2022-05-31 | End: 2022-05-31 | Stop reason: SURG

## 2022-05-31 RX ORDER — BUPIVACAINE HYDROCHLORIDE AND EPINEPHRINE 5; 5 MG/ML; UG/ML
INJECTION, SOLUTION PERINEURAL AS NEEDED
Status: DISCONTINUED | OUTPATIENT
Start: 2022-05-31 | End: 2022-05-31 | Stop reason: HOSPADM

## 2022-05-31 RX ORDER — FENTANYL CITRATE 50 UG/ML
50 INJECTION, SOLUTION INTRAMUSCULAR; INTRAVENOUS
Status: DISCONTINUED | OUTPATIENT
Start: 2022-05-31 | End: 2022-05-31 | Stop reason: HOSPADM

## 2022-05-31 RX ORDER — SODIUM CHLORIDE 0.9 % (FLUSH) 0.9 %
10 SYRINGE (ML) INJECTION EVERY 12 HOURS SCHEDULED
Status: DISCONTINUED | OUTPATIENT
Start: 2022-05-31 | End: 2022-05-31 | Stop reason: HOSPADM

## 2022-05-31 RX ORDER — ONDANSETRON 2 MG/ML
INJECTION INTRAMUSCULAR; INTRAVENOUS AS NEEDED
Status: DISCONTINUED | OUTPATIENT
Start: 2022-05-31 | End: 2022-05-31 | Stop reason: SURG

## 2022-05-31 RX ORDER — SODIUM CHLORIDE, SODIUM LACTATE, POTASSIUM CHLORIDE, CALCIUM CHLORIDE 600; 310; 30; 20 MG/100ML; MG/100ML; MG/100ML; MG/100ML
100 INJECTION, SOLUTION INTRAVENOUS ONCE AS NEEDED
Status: DISCONTINUED | OUTPATIENT
Start: 2022-05-31 | End: 2022-05-31 | Stop reason: HOSPADM

## 2022-05-31 RX ORDER — CEFAZOLIN SODIUM 2 G/50ML
2 SOLUTION INTRAVENOUS ONCE
Status: COMPLETED | OUTPATIENT
Start: 2022-05-31 | End: 2022-05-31

## 2022-05-31 RX ORDER — PROPOFOL 10 MG/ML
VIAL (ML) INTRAVENOUS AS NEEDED
Status: DISCONTINUED | OUTPATIENT
Start: 2022-05-31 | End: 2022-05-31 | Stop reason: SURG

## 2022-05-31 RX ADMIN — PROPOFOL 140 MG: 10 INJECTION, EMULSION INTRAVENOUS at 09:28

## 2022-05-31 RX ADMIN — SODIUM CHLORIDE, POTASSIUM CHLORIDE, SODIUM LACTATE AND CALCIUM CHLORIDE: 600; 310; 30; 20 INJECTION, SOLUTION INTRAVENOUS at 09:23

## 2022-05-31 RX ADMIN — SODIUM CHLORIDE, POTASSIUM CHLORIDE, SODIUM LACTATE AND CALCIUM CHLORIDE 125 ML/HR: 600; 310; 30; 20 INJECTION, SOLUTION INTRAVENOUS at 08:49

## 2022-05-31 RX ADMIN — FENTANYL CITRATE 100 MCG: 50 INJECTION INTRAMUSCULAR; INTRAVENOUS at 09:23

## 2022-05-31 RX ADMIN — LIDOCAINE HYDROCHLORIDE 100 MG: 20 INJECTION, SOLUTION EPIDURAL; INFILTRATION; INTRACAUDAL; PERINEURAL at 09:28

## 2022-05-31 RX ADMIN — FAMOTIDINE 20 MG: 10 INJECTION INTRAVENOUS at 09:23

## 2022-05-31 RX ADMIN — CEFAZOLIN SODIUM 2 G: 2 SOLUTION INTRAVENOUS at 09:23

## 2022-05-31 RX ADMIN — MIDAZOLAM 2 MG: 1 INJECTION INTRAMUSCULAR; INTRAVENOUS at 09:23

## 2022-05-31 RX ADMIN — ONDANSETRON 4 MG: 2 INJECTION INTRAMUSCULAR; INTRAVENOUS at 09:23

## 2022-05-31 NOTE — ANESTHESIA PREPROCEDURE EVALUATION
Anesthesia Evaluation     no history of anesthetic complications:  NPO Solid Status: > 8 hours  NPO Liquid Status: > 8 hours           Airway   Mallampati: II  TM distance: >3 FB  Neck ROM: full  No difficulty expected  Dental - normal exam     Pulmonary - normal exam   Cardiovascular - normal exam    (+) hypertension,       Neuro/Psych  GI/Hepatic/Renal/Endo    (+)  GERD,      Musculoskeletal     (+) back pain,   Abdominal  - normal exam   Substance History      OB/GYN          Other        ROS/Med Hx Other: Hx of Stanton Ariel                  Anesthesia Plan    ASA 2     general     intravenous induction     Anesthetic plan, all risks, benefits, and alternatives have been provided, discussed and informed consent has been obtained with: patient.        CODE STATUS:

## 2022-05-31 NOTE — ANESTHESIA PROCEDURE NOTES
Airway  Urgency: elective    Date/Time: 5/31/2022 9:29 AM  Airway not difficult    General Information and Staff    Patient location during procedure: OR  Anesthesiologist: Chico Pfeiffer MD  CRNA/CAA: Surinder Quezada CRNA    Indications and Patient Condition    Preoxygenated: yes  MILS not maintained throughout  Mask difficulty assessment: 0 - not attempted    Final Airway Details  Final airway type: supraglottic airway      Successful airway: unique  Size 4    Number of attempts at approach: 1  Assessment: lips, teeth, and gum same as pre-op and atraumatic intubation    Additional Comments  Atraumatic LMA placement, dentition unchanged.

## 2022-06-03 LAB — REF LAB TEST METHOD: NORMAL

## 2022-06-09 ENCOUNTER — OFFICE VISIT (OUTPATIENT)
Dept: SURGERY | Facility: CLINIC | Age: 52
End: 2022-06-09

## 2022-06-09 VITALS
WEIGHT: 230.6 LBS | DIASTOLIC BLOOD PRESSURE: 62 MMHG | HEART RATE: 55 BPM | BODY MASS INDEX: 43.54 KG/M2 | HEIGHT: 61 IN | SYSTOLIC BLOOD PRESSURE: 112 MMHG

## 2022-06-09 DIAGNOSIS — Z09 POSTOP CHECK: ICD-10-CM

## 2022-06-09 DIAGNOSIS — R92.8 ABNORMAL MAMMOGRAM: Primary | ICD-10-CM

## 2022-06-09 PROCEDURE — 99024 POSTOP FOLLOW-UP VISIT: CPT | Performed by: SURGERY

## 2022-06-09 NOTE — PROGRESS NOTES
"Subjective   Kadi Awad is a 51 y.o. female here today for post op.    History of Present Illness  Ms. Awad was seen in the office today for her first post op visit following a left breast biopsy on 5/31/2022.  Final pathology did not show any additional findings except for the organizing biopsy site.  Patient voices no complaints related to the procedure..  Allergies   Allergen Reactions   • Minocycline-Acne Care Products Other (See Comments) and Dizziness     headaches         Current Outpatient Medications   Medication Sig Dispense Refill   • HYDROcodone-acetaminophen (Norco) 7.5-325 MG per tablet Take 1 tablet by mouth 4 (Four) Times a Day As Needed for Moderate Pain. 8 tablet 0   • lisinopril-hydrochlorothiazide (PRINZIDE,ZESTORETIC) 20-12.5 MG per tablet Take 1 tablet by mouth Daily.     • loratadine (CLARITIN) 10 MG tablet Take 10 mg by mouth Daily.     • pantoprazole (PROTONIX) 40 MG EC tablet Take 40 mg by mouth Daily.     • Pyridoxine HCl (B-6 PO) Take  by mouth Daily.     • Turmeric 500 MG capsule Take 500 mg by mouth Daily.     • vitamin B-12 (CYANOCOBALAMIN) 100 MCG tablet Take 50 mcg by mouth Daily.       No current facility-administered medications for this visit.       Objective   /62 (BP Location: Left arm)   Pulse 55   Ht 154.9 cm (61\")   Wt 105 kg (230 lb 9.6 oz)   BMI 43.57 kg/m²    Physical Exam  Left breast: Healing surgical scar in the lateral for the 5 o'clock position with expected postop change    Results/Data  Pathology result was reviewed and discussed with the patient    Procedures     Assessment & Plan   Stable course, status post left breast excisional biopsy  Atypical lobular hyperplasia on prior core biopsy    Patient advised to follow breast center recommendations regarding follow-up imaging.  Follow up with me as needed       Discussion/Summary: Patient is not a candidate for an MRI.  She is aware of the increased risk associated with the finding of ALH.  She is " aware of the importance of yearly mammography.  Class 3 Severe Obesity (BMI >=40). Obesity-related health conditions include the following: none. Obesity is improving with lifestyle modifications. BMI is is above average; no BMI management plan is appropriate. We discussed portion control and increasing exercise.       Future Appointments   Date Time Provider Department Center   6/9/2022  9:05 AM Lauren Lucas MD [facility]         Please note that portions of this note were completed with a voice recognition program.

## 2022-11-13 ENCOUNTER — HOSPITAL ENCOUNTER (EMERGENCY)
Facility: HOSPITAL | Age: 52
Discharge: ANOTHER HOSPITAL | End: 2022-11-13
Attending: STUDENT IN AN ORGANIZED HEALTH CARE EDUCATION/TRAINING PROGRAM | Admitting: EMERGENCY MEDICINE

## 2022-11-13 ENCOUNTER — APPOINTMENT (OUTPATIENT)
Dept: GENERAL RADIOLOGY | Facility: HOSPITAL | Age: 52
End: 2022-11-13

## 2022-11-13 ENCOUNTER — APPOINTMENT (OUTPATIENT)
Dept: CT IMAGING | Facility: HOSPITAL | Age: 52
End: 2022-11-13

## 2022-11-13 VITALS
DIASTOLIC BLOOD PRESSURE: 93 MMHG | SYSTOLIC BLOOD PRESSURE: 126 MMHG | BODY MASS INDEX: 45.16 KG/M2 | WEIGHT: 230 LBS | RESPIRATION RATE: 18 BRPM | TEMPERATURE: 98.5 F | OXYGEN SATURATION: 99 % | HEART RATE: 79 BPM | HEIGHT: 60 IN

## 2022-11-13 DIAGNOSIS — K44.0 DIAPHRAGMATIC HERNIA WITH OBSTRUCTION, WITHOUT GANGRENE: ICD-10-CM

## 2022-11-13 DIAGNOSIS — R82.4 KETONURIA: ICD-10-CM

## 2022-11-13 DIAGNOSIS — K31.1 GASTRIC OUTLET OBSTRUCTION: Primary | ICD-10-CM

## 2022-11-13 DIAGNOSIS — R11.10 INTRACTABLE VOMITING: ICD-10-CM

## 2022-11-13 DIAGNOSIS — D72.829 LEUKOCYTOSIS, UNSPECIFIED TYPE: ICD-10-CM

## 2022-11-13 LAB
ALBUMIN SERPL-MCNC: 4.37 G/DL (ref 3.5–5.2)
ALBUMIN/GLOB SERPL: 1.2 G/DL
ALP SERPL-CCNC: 77 U/L (ref 39–117)
ALT SERPL W P-5'-P-CCNC: 25 U/L (ref 1–33)
AMYLASE SERPL-CCNC: 41 U/L (ref 28–100)
ANION GAP SERPL CALCULATED.3IONS-SCNC: 16.9 MMOL/L (ref 5–15)
AST SERPL-CCNC: 29 U/L (ref 1–32)
BACTERIA UR QL AUTO: ABNORMAL /HPF
BASOPHILS # BLD AUTO: 0.02 10*3/MM3 (ref 0–0.2)
BASOPHILS NFR BLD AUTO: 0.1 % (ref 0–1.5)
BILIRUB SERPL-MCNC: 0.4 MG/DL (ref 0–1.2)
BILIRUB UR QL STRIP: ABNORMAL
BUN SERPL-MCNC: 18 MG/DL (ref 6–20)
BUN/CREAT SERPL: 25 (ref 7–25)
CALCIUM SPEC-SCNC: 10.2 MG/DL (ref 8.6–10.5)
CHLORIDE SERPL-SCNC: 99 MMOL/L (ref 98–107)
CLARITY UR: ABNORMAL
CO2 SERPL-SCNC: 25.1 MMOL/L (ref 22–29)
COLOR UR: ABNORMAL
CREAT SERPL-MCNC: 0.72 MG/DL (ref 0.57–1)
D-LACTATE SERPL-SCNC: 1.6 MMOL/L (ref 0.5–2)
DEPRECATED RDW RBC AUTO: 42.7 FL (ref 37–54)
EGFRCR SERPLBLD CKD-EPI 2021: 102 ML/MIN/1.73
EOSINOPHIL # BLD AUTO: 0.01 10*3/MM3 (ref 0–0.4)
EOSINOPHIL NFR BLD AUTO: 0.1 % (ref 0.3–6.2)
ERYTHROCYTE [DISTWIDTH] IN BLOOD BY AUTOMATED COUNT: 13.2 % (ref 12.3–15.4)
FLUAV SUBTYP SPEC NAA+PROBE: NOT DETECTED
FLUBV RNA ISLT QL NAA+PROBE: NOT DETECTED
GLOBULIN UR ELPH-MCNC: 3.5 GM/DL
GLUCOSE SERPL-MCNC: 172 MG/DL (ref 65–99)
GLUCOSE UR STRIP-MCNC: ABNORMAL MG/DL
HCT VFR BLD AUTO: 42 % (ref 34–46.6)
HGB BLD-MCNC: 13.9 G/DL (ref 12–15.9)
HGB UR QL STRIP.AUTO: NEGATIVE
HYALINE CASTS UR QL AUTO: ABNORMAL /LPF
IMM GRANULOCYTES # BLD AUTO: 0.15 10*3/MM3 (ref 0–0.05)
IMM GRANULOCYTES NFR BLD AUTO: 0.8 % (ref 0–0.5)
KETONES UR QL STRIP: ABNORMAL
LEUKOCYTE ESTERASE UR QL STRIP.AUTO: NEGATIVE
LIPASE SERPL-CCNC: 35 U/L (ref 13–60)
LYMPHOCYTES # BLD AUTO: 0.73 10*3/MM3 (ref 0.7–3.1)
LYMPHOCYTES NFR BLD AUTO: 3.8 % (ref 19.6–45.3)
MCH RBC QN AUTO: 29.5 PG (ref 26.6–33)
MCHC RBC AUTO-ENTMCNC: 33.1 G/DL (ref 31.5–35.7)
MCV RBC AUTO: 89.2 FL (ref 79–97)
MONOCYTES # BLD AUTO: 1.05 10*3/MM3 (ref 0.1–0.9)
MONOCYTES NFR BLD AUTO: 5.5 % (ref 5–12)
MUCOUS THREADS URNS QL MICRO: ABNORMAL /HPF
NEUTROPHILS NFR BLD AUTO: 17.24 10*3/MM3 (ref 1.7–7)
NEUTROPHILS NFR BLD AUTO: 89.7 % (ref 42.7–76)
NITRITE UR QL STRIP: NEGATIVE
NRBC BLD AUTO-RTO: 0 /100 WBC (ref 0–0.2)
PH UR STRIP.AUTO: 5.5 [PH] (ref 5–8)
PLATELET # BLD AUTO: 438 10*3/MM3 (ref 140–450)
PMV BLD AUTO: 10.1 FL (ref 6–12)
POTASSIUM SERPL-SCNC: 3.2 MMOL/L (ref 3.5–5.2)
PROT SERPL-MCNC: 7.9 G/DL (ref 6–8.5)
PROT UR QL STRIP: ABNORMAL
RBC # BLD AUTO: 4.71 10*6/MM3 (ref 3.77–5.28)
RBC # UR STRIP: ABNORMAL /HPF
REF LAB TEST METHOD: ABNORMAL
SARS-COV-2 RNA PNL SPEC NAA+PROBE: NOT DETECTED
SODIUM SERPL-SCNC: 141 MMOL/L (ref 136–145)
SP GR UR STRIP: >1.03 (ref 1–1.03)
SQUAMOUS #/AREA URNS HPF: ABNORMAL /HPF
UROBILINOGEN UR QL STRIP: ABNORMAL
WBC # UR STRIP: ABNORMAL /HPF
WBC NRBC COR # BLD: 19.2 10*3/MM3 (ref 3.4–10.8)

## 2022-11-13 PROCEDURE — 96365 THER/PROPH/DIAG IV INF INIT: CPT

## 2022-11-13 PROCEDURE — 80053 COMPREHEN METABOLIC PANEL: CPT | Performed by: NURSE PRACTITIONER

## 2022-11-13 PROCEDURE — 82150 ASSAY OF AMYLASE: CPT | Performed by: NURSE PRACTITIONER

## 2022-11-13 PROCEDURE — 25010000002: Performed by: STUDENT IN AN ORGANIZED HEALTH CARE EDUCATION/TRAINING PROGRAM

## 2022-11-13 PROCEDURE — 96361 HYDRATE IV INFUSION ADD-ON: CPT

## 2022-11-13 PROCEDURE — 74177 CT ABD & PELVIS W/CONTRAST: CPT

## 2022-11-13 PROCEDURE — 96375 TX/PRO/DX INJ NEW DRUG ADDON: CPT

## 2022-11-13 PROCEDURE — 83605 ASSAY OF LACTIC ACID: CPT | Performed by: STUDENT IN AN ORGANIZED HEALTH CARE EDUCATION/TRAINING PROGRAM

## 2022-11-13 PROCEDURE — 36415 COLL VENOUS BLD VENIPUNCTURE: CPT

## 2022-11-13 PROCEDURE — 87040 BLOOD CULTURE FOR BACTERIA: CPT | Performed by: STUDENT IN AN ORGANIZED HEALTH CARE EDUCATION/TRAINING PROGRAM

## 2022-11-13 PROCEDURE — 71045 X-RAY EXAM CHEST 1 VIEW: CPT

## 2022-11-13 PROCEDURE — 25010000002 ONDANSETRON PER 1 MG: Performed by: STUDENT IN AN ORGANIZED HEALTH CARE EDUCATION/TRAINING PROGRAM

## 2022-11-13 PROCEDURE — 25010000002 PIPERACILLIN SOD-TAZOBACTAM PER 1 G: Performed by: STUDENT IN AN ORGANIZED HEALTH CARE EDUCATION/TRAINING PROGRAM

## 2022-11-13 PROCEDURE — 25010000002 ONDANSETRON PER 1 MG

## 2022-11-13 PROCEDURE — 96367 TX/PROPH/DG ADDL SEQ IV INF: CPT

## 2022-11-13 PROCEDURE — 99285 EMERGENCY DEPT VISIT HI MDM: CPT

## 2022-11-13 PROCEDURE — 87636 SARSCOV2 & INF A&B AMP PRB: CPT | Performed by: STUDENT IN AN ORGANIZED HEALTH CARE EDUCATION/TRAINING PROGRAM

## 2022-11-13 PROCEDURE — 81001 URINALYSIS AUTO W/SCOPE: CPT | Performed by: NURSE PRACTITIONER

## 2022-11-13 PROCEDURE — 83690 ASSAY OF LIPASE: CPT | Performed by: NURSE PRACTITIONER

## 2022-11-13 PROCEDURE — 25010000002 IOPAMIDOL 61 % SOLUTION: Performed by: STUDENT IN AN ORGANIZED HEALTH CARE EDUCATION/TRAINING PROGRAM

## 2022-11-13 PROCEDURE — 96376 TX/PRO/DX INJ SAME DRUG ADON: CPT

## 2022-11-13 PROCEDURE — 85025 COMPLETE CBC W/AUTO DIFF WBC: CPT | Performed by: NURSE PRACTITIONER

## 2022-11-13 RX ORDER — ONDANSETRON 2 MG/ML
4 INJECTION INTRAMUSCULAR; INTRAVENOUS ONCE
Status: COMPLETED | OUTPATIENT
Start: 2022-11-13 | End: 2022-11-13

## 2022-11-13 RX ORDER — ONDANSETRON 2 MG/ML
INJECTION INTRAMUSCULAR; INTRAVENOUS
Status: COMPLETED
Start: 2022-11-13 | End: 2022-11-13

## 2022-11-13 RX ADMIN — PIPERACILLIN SODIUM AND TAZOBACTAM SODIUM 3.38 G: 3; .375 INJECTION, POWDER, LYOPHILIZED, FOR SOLUTION INTRAVENOUS at 21:50

## 2022-11-13 RX ADMIN — ONDANSETRON 4 MG: 2 INJECTION INTRAMUSCULAR; INTRAVENOUS at 19:56

## 2022-11-13 RX ADMIN — CEFTRIAXONE 1 G: 1 INJECTION, POWDER, FOR SOLUTION INTRAMUSCULAR; INTRAVENOUS at 20:41

## 2022-11-13 RX ADMIN — ONDANSETRON 4 MG: 2 INJECTION INTRAMUSCULAR; INTRAVENOUS at 18:28

## 2022-11-13 RX ADMIN — SODIUM CHLORIDE 1000 ML: 9 INJECTION, SOLUTION INTRAVENOUS at 18:27

## 2022-11-13 RX ADMIN — SODIUM CHLORIDE 1000 ML: 9 INJECTION, SOLUTION INTRAVENOUS at 20:35

## 2022-11-13 RX ADMIN — IOPAMIDOL 88 ML: 612 INJECTION, SOLUTION INTRAVENOUS at 19:24

## 2022-11-13 NOTE — ED NOTES
MEDICAL SCREENING:    Reason for Visit: Constipation, vomiting      Patient initially seen in triage.  The patient was advised further evaluation and diagnostic testing will be needed, some of the treatment and testing will be initiated in the lobby in order to begin the process.  The patient will be returned to the waiting area for the time being and possibly be re-assessed by a subsequent ED provider.  The patient will be brought back to the treatment area in as timely manner as possible.         Estuardo Mendoza, APRN  11/13/22 1412

## 2022-11-14 NOTE — ED PROVIDER NOTES
Subjective   History of Present Illness  52-year-old female seen and evaluated in room 8 with complaints of generalized abdominal pain with reported complaints of constipation a past 2 days with worsening nausea and intractable vomiting that started late Saturday night into early Sunday morning.  Patient states her last oral intake was Saturday night around 9 PM.  He is attempted to consume liquid intake but has had intractable vomiting and has become progressively weak.  Patient states that she has had no prior intra-abdominal surgeries.        Review of Systems   Constitutional: Negative for fever.   HENT: Negative.    Respiratory: Negative.    Cardiovascular: Negative.  Negative for chest pain.   Gastrointestinal: Positive for abdominal distention, abdominal pain, constipation, nausea and vomiting.   Endocrine: Negative.    Genitourinary: Negative.  Negative for dysuria.   Skin: Negative.    Neurological: Positive for weakness and light-headedness.   Psychiatric/Behavioral: Negative.    All other systems reviewed and are negative.      Past Medical History:   Diagnosis Date   • GERD (gastroesophageal reflux disease)    • History of transfusion    • Hypertension    • Lymphedema    • Scoliosis        Allergies   Allergen Reactions   • Minocycline-Acne Care Products Other (See Comments) and Dizziness     headaches       Past Surgical History:   Procedure Laterality Date   • BREAST BIOPSY Left 5/31/2022    Procedure: BREAST BIOPSY WITH NEEDLE LOCALIZATION;  Surgeon: Lauren Lucas MD;  Location: [facility];  Service: General;  Laterality: Left;   • COLONOSCOPY     • ENDOSCOPY     • SPINE SURGERY      steel froy for scoliosis       Family History   Problem Relation Age of Onset   • Hyperlipidemia Mother    • Hyperlipidemia Father    • Breast cancer Neg Hx        Social History     Socioeconomic History   • Marital status: Single   Tobacco Use   • Smoking status: Never   • Smokeless tobacco: Never   Vaping Use   •  Vaping Use: Never used   Substance and Sexual Activity   • Alcohol use: No   • Drug use: No   • Sexual activity: Defer           Objective   Physical Exam  Vitals and nursing note reviewed.   Constitutional:       General: She is not in acute distress.     Appearance: She is well-developed. She is obese. She is ill-appearing. She is not diaphoretic.      Comments: Awake alert oriented x4 GCS of 15.  Patient appears acutely ill and diaphoretic.  She is sitting on the side of the bed has recently vomited   HENT:      Head: Normocephalic and atraumatic.      Right Ear: External ear normal.      Left Ear: External ear normal.      Nose: Nose normal.      Mouth/Throat:      Mouth: Mucous membranes are moist.   Eyes:      Extraocular Movements: Extraocular movements intact.      Conjunctiva/sclera: Conjunctivae normal.      Pupils: Pupils are equal, round, and reactive to light.   Neck:      Vascular: No JVD.      Trachea: No tracheal deviation.   Cardiovascular:      Rate and Rhythm: Normal rate and regular rhythm.      Heart sounds: Normal heart sounds. No murmur heard.  Pulmonary:      Effort: Pulmonary effort is normal. No respiratory distress.      Breath sounds: Normal breath sounds. No wheezing.   Abdominal:      General: Bowel sounds are normal. There is distension.      Palpations: Abdomen is soft.      Tenderness: There is abdominal tenderness.      Comments: Normal distention most prominently in the upper epigastric region   Musculoskeletal:         General: No deformity. Normal range of motion.      Cervical back: Normal range of motion and neck supple.   Skin:     General: Skin is warm and dry.      Coloration: Skin is not pale.      Findings: No erythema or rash.   Neurological:      General: No focal deficit present.      Mental Status: She is alert and oriented to person, place, and time.      Cranial Nerves: No cranial nerve deficit.   Psychiatric:         Behavior: Behavior normal.         Thought  Content: Thought content normal.         Procedures       Results for orders placed or performed during the hospital encounter of 11/13/22   COVID-19 and FLU A/B PCR - Swab, Nasopharynx    Specimen: Nasopharynx; Swab   Result Value Ref Range    COVID19 Not Detected Not Detected - Ref. Range    Influenza A PCR Not Detected Not Detected    Influenza B PCR Not Detected Not Detected   Comprehensive Metabolic Panel    Specimen: Blood   Result Value Ref Range    Glucose 172 (H) 65 - 99 mg/dL    BUN 18 6 - 20 mg/dL    Creatinine 0.72 0.57 - 1.00 mg/dL    Sodium 141 136 - 145 mmol/L    Potassium 3.2 (L) 3.5 - 5.2 mmol/L    Chloride 99 98 - 107 mmol/L    CO2 25.1 22.0 - 29.0 mmol/L    Calcium 10.2 8.6 - 10.5 mg/dL    Total Protein 7.9 6.0 - 8.5 g/dL    Albumin 4.37 3.50 - 5.20 g/dL    ALT (SGPT) 25 1 - 33 U/L    AST (SGOT) 29 1 - 32 U/L    Alkaline Phosphatase 77 39 - 117 U/L    Total Bilirubin 0.4 0.0 - 1.2 mg/dL    Globulin 3.5 gm/dL    A/G Ratio 1.2 g/dL    BUN/Creatinine Ratio 25.0 7.0 - 25.0    Anion Gap 16.9 (H) 5.0 - 15.0 mmol/L    eGFR 102.0 >60.0 mL/min/1.73   Amylase    Specimen: Blood   Result Value Ref Range    Amylase 41 28 - 100 U/L   Lipase    Specimen: Blood   Result Value Ref Range    Lipase 35 13 - 60 U/L   Urinalysis With Microscopic If Indicated (No Culture) - Urine, Clean Catch    Specimen: Urine, Clean Catch   Result Value Ref Range    Color, UA Dark Yellow (A) Yellow, Straw    Appearance, UA Cloudy (A) Clear    pH, UA 5.5 5.0 - 8.0    Specific Gravity, UA >1.030 (H) 1.005 - 1.030    Glucose,  mg/dL (Trace) (A) Negative    Ketones, UA >=160 mg/dL (4+) (A) Negative    Bilirubin, UA Small (1+) (A) Negative    Blood, UA Negative Negative    Protein, UA 30 mg/dL (1+) (A) Negative    Leuk Esterase, UA Negative Negative    Nitrite, UA Negative Negative    Urobilinogen, UA 1.0 E.U./dL 0.2 - 1.0 E.U./dL   CBC Auto Differential    Specimen: Blood   Result Value Ref Range    WBC 19.20 (H) 3.40 - 10.80  10*3/mm3    RBC 4.71 3.77 - 5.28 10*6/mm3    Hemoglobin 13.9 12.0 - 15.9 g/dL    Hematocrit 42.0 34.0 - 46.6 %    MCV 89.2 79.0 - 97.0 fL    MCH 29.5 26.6 - 33.0 pg    MCHC 33.1 31.5 - 35.7 g/dL    RDW 13.2 12.3 - 15.4 %    RDW-SD 42.7 37.0 - 54.0 fl    MPV 10.1 6.0 - 12.0 fL    Platelets 438 140 - 450 10*3/mm3    Neutrophil % 89.7 (H) 42.7 - 76.0 %    Lymphocyte % 3.8 (L) 19.6 - 45.3 %    Monocyte % 5.5 5.0 - 12.0 %    Eosinophil % 0.1 (L) 0.3 - 6.2 %    Basophil % 0.1 0.0 - 1.5 %    Immature Grans % 0.8 (H) 0.0 - 0.5 %    Neutrophils, Absolute 17.24 (H) 1.70 - 7.00 10*3/mm3    Lymphocytes, Absolute 0.73 0.70 - 3.10 10*3/mm3    Monocytes, Absolute 1.05 (H) 0.10 - 0.90 10*3/mm3    Eosinophils, Absolute 0.01 0.00 - 0.40 10*3/mm3    Basophils, Absolute 0.02 0.00 - 0.20 10*3/mm3    Immature Grans, Absolute 0.15 (H) 0.00 - 0.05 10*3/mm3    nRBC 0.0 0.0 - 0.2 /100 WBC   Urinalysis, Microscopic Only - Urine, Clean Catch    Specimen: Urine, Clean Catch   Result Value Ref Range    RBC, UA 0-2 None Seen, 0-2 /HPF    WBC, UA 6-12 (A) None Seen, 0-2 /HPF    Bacteria, UA 1+ (A) None Seen /HPF    Squamous Epithelial Cells, UA 13-20 (A) None Seen, 0-2 /HPF    Hyaline Casts, UA 0-2 None Seen /LPF    Mucus, UA Moderate/2+ (A) None Seen, Trace /HPF    Methodology Manual Light Microscopy      XR Chest 1 View   Final Result   The nasogastric tube tip is in the body the stomach under the hemidiaphragm      Signer Name: Buddy Diamond MD    Signed: 11/13/2022 9:19 PM    Workstation Name: [email or web address]     [facility]      CT Abdomen Pelvis With Contrast   Final Result      1.  Dilated rotated fluid filled stomach with portions of the distal rotated stomach herniating through left-sided diaphragmatic hernia. Findings are concerning for gastric outlet obstruction with possible volvulus.   2.  Left lower lobe opacity adjacent to the herniated stomach favored to represent atelectasis.   3.  3.3 cm heterogeneously  hyperdense left adrenal nodule, indeterminate. Recommend nonemergent adrenal protocol CT or MRI for further evaluation.   4.  3.7 cm low-density right adnexal lesion with peripheral calcification. In postmenopausal patients, findings may represent complex ovarian cysts or cystic ovarian neoplasm. Recommend further evaluation with nonemergent dedicated pelvic ultrasound.   5.  1 cm exophytic hyperdense right renal lesion. Findings may represent sebaceous renal cyst or renal neoplasm. Recommend dedicated renal protocol CT or MRI an/or renal ultrasound.      Signer Name: Joey Pablo MD    Signed: 2022 7:44 PM    Workstation Name: [email or web address]     [facility]          ED Course  ED Course as of 22   [place]  Spoke with Dr. Kunz about patient requiring surgical repair to correct gastric outlet obstruction with possible volvulus.  States that patient would require higher level of care for repair. [LK]    Dr Vang-with  transfer- [facility] shared imaging to - Dr Thompson (Surgeon)  [LK]    Patient has received Rocephin 1 g IVP x1 and a Zosyn 3.375 IVP x1  A total of 2000 mL of intravenous fluid for ketonuria and patient being NPO.  Nausea and vomitin mg Zofran IVP x2 doses for total of 8 mg while in the ED prior to transfer.    NG tube was successfully placed with a total of roughly 350 mL of gastric content being suctioned that appears to be dark brown in color. [LK]      ED Course User Index  [LK] Gladys Rausch DO                                           [facility]    Final diagnoses:   Gastric outlet obstruction   Diaphragmatic hernia with obstruction, without gangrene   Intractable vomiting   Ketonuria   Leukocytosis, unspecified type       ED Disposition  ED Disposition     ED Disposition   Transfer to Another Facility     Condition   --    Comment   --             No follow-up provider specified.       Medication List      No changes were made to your  prescriptions during this visit.         Gladys Rausch,   11/13/22 9987

## 2022-11-18 LAB
BACTERIA SPEC AEROBE CULT: NORMAL
BACTERIA SPEC AEROBE CULT: NORMAL

## 2023-02-27 ENCOUNTER — TELEPHONE (OUTPATIENT)
Dept: ONCOLOGY | Facility: CLINIC | Age: 53
End: 2023-02-27

## 2023-02-27 NOTE — TELEPHONE ENCOUNTER
Caller: Kadi Awad    Relationship to patient: Self    Best call back number: [phone]    Chief complaint: PT NEEDS TO SCHEDULE A FOLLOW UP    Type of visit: FOLLOW UP    Requested date:WED, THUR, FRIDAYS ARE GOOD  MARCH CAN NOT DO 13TH THROUGH 24TH

## 2023-03-08 ENCOUNTER — OFFICE VISIT (OUTPATIENT)
Dept: ONCOLOGY | Facility: CLINIC | Age: 53
End: 2023-03-08
Payer: COMMERCIAL

## 2023-03-08 ENCOUNTER — LAB (OUTPATIENT)
Dept: ONCOLOGY | Facility: CLINIC | Age: 53
End: 2023-03-08
Payer: COMMERCIAL

## 2023-03-08 VITALS
HEART RATE: 79 BPM | SYSTOLIC BLOOD PRESSURE: 108 MMHG | OXYGEN SATURATION: 99 % | RESPIRATION RATE: 18 BRPM | DIASTOLIC BLOOD PRESSURE: 73 MMHG | BODY MASS INDEX: 43.98 KG/M2 | HEIGHT: 60 IN | TEMPERATURE: 97.8 F | WEIGHT: 224 LBS

## 2023-03-08 DIAGNOSIS — D50.9 IRON DEFICIENCY ANEMIA, UNSPECIFIED IRON DEFICIENCY ANEMIA TYPE: ICD-10-CM

## 2023-03-08 DIAGNOSIS — K90.9 MALABSORPTION OF IRON: ICD-10-CM

## 2023-03-08 DIAGNOSIS — D50.9 IRON DEFICIENCY ANEMIA, UNSPECIFIED IRON DEFICIENCY ANEMIA TYPE: Primary | ICD-10-CM

## 2023-03-08 DIAGNOSIS — E53.8 B12 DEFICIENCY: ICD-10-CM

## 2023-03-08 LAB
BASOPHILS # BLD AUTO: 0.07 10*3/MM3 (ref 0–0.2)
BASOPHILS NFR BLD AUTO: 0.9 % (ref 0–1.5)
DEPRECATED RDW RBC AUTO: 47 FL (ref 37–54)
EOSINOPHIL # BLD AUTO: 0.04 10*3/MM3 (ref 0–0.4)
EOSINOPHIL NFR BLD AUTO: 0.5 % (ref 0.3–6.2)
ERYTHROCYTE [DISTWIDTH] IN BLOOD BY AUTOMATED COUNT: 14.3 % (ref 12.3–15.4)
FERRITIN SERPL-MCNC: 17.79 NG/ML (ref 13–150)
HCT VFR BLD AUTO: 39.2 % (ref 34–46.6)
HGB BLD-MCNC: 12.8 G/DL (ref 12–15.9)
IMM GRANULOCYTES # BLD AUTO: 0.03 10*3/MM3 (ref 0–0.05)
IMM GRANULOCYTES NFR BLD AUTO: 0.4 % (ref 0–0.5)
IRON 24H UR-MRATE: 81 MCG/DL (ref 37–145)
IRON SATN MFR SERPL: 21 % (ref 20–50)
LYMPHOCYTES # BLD AUTO: 2.2 10*3/MM3 (ref 0.7–3.1)
LYMPHOCYTES NFR BLD AUTO: 29.7 % (ref 19.6–45.3)
MCH RBC QN AUTO: 29.3 PG (ref 26.6–33)
MCHC RBC AUTO-ENTMCNC: 32.7 G/DL (ref 31.5–35.7)
MCV RBC AUTO: 89.7 FL (ref 79–97)
MONOCYTES # BLD AUTO: 0.57 10*3/MM3 (ref 0.1–0.9)
MONOCYTES NFR BLD AUTO: 7.7 % (ref 5–12)
NEUTROPHILS NFR BLD AUTO: 4.5 10*3/MM3 (ref 1.7–7)
NEUTROPHILS NFR BLD AUTO: 60.8 % (ref 42.7–76)
NRBC BLD AUTO-RTO: 0 /100 WBC (ref 0–0.2)
PLATELET # BLD AUTO: 379 10*3/MM3 (ref 140–450)
PMV BLD AUTO: 10 FL (ref 6–12)
RBC # BLD AUTO: 4.37 10*6/MM3 (ref 3.77–5.28)
TIBC SERPL-MCNC: 395 MCG/DL (ref 298–536)
TRANSFERRIN SERPL-MCNC: 265 MG/DL (ref 200–360)
WBC NRBC COR # BLD: 7.41 10*3/MM3 (ref 3.4–10.8)

## 2023-03-08 PROCEDURE — 82607 VITAMIN B-12: CPT | Performed by: NURSE PRACTITIONER

## 2023-03-08 PROCEDURE — 36415 COLL VENOUS BLD VENIPUNCTURE: CPT | Performed by: NURSE PRACTITIONER

## 2023-03-08 PROCEDURE — 82728 ASSAY OF FERRITIN: CPT | Performed by: NURSE PRACTITIONER

## 2023-03-08 PROCEDURE — 85025 COMPLETE CBC W/AUTO DIFF WBC: CPT | Performed by: NURSE PRACTITIONER

## 2023-03-08 PROCEDURE — 84466 ASSAY OF TRANSFERRIN: CPT | Performed by: NURSE PRACTITIONER

## 2023-03-08 PROCEDURE — 99214 OFFICE O/P EST MOD 30 MIN: CPT | Performed by: NURSE PRACTITIONER

## 2023-03-08 PROCEDURE — 83540 ASSAY OF IRON: CPT | Performed by: NURSE PRACTITIONER

## 2023-03-08 RX ORDER — SODIUM CHLORIDE 9 MG/ML
250 INJECTION, SOLUTION INTRAVENOUS ONCE
Status: CANCELLED | OUTPATIENT
Start: 2023-03-14

## 2023-03-08 RX ORDER — SODIUM CHLORIDE 9 MG/ML
250 INJECTION, SOLUTION INTRAVENOUS ONCE
Status: CANCELLED | OUTPATIENT
Start: 2023-03-24

## 2023-03-08 NOTE — PROGRESS NOTES
Venipuncture Blood Specimen Collection  Venipuncture performed in left arm by Catherine Feliciano MA with good hemostasis. Patient tolerated the procedure well without complications.   03/08/23   Catherine Feliciano MA

## 2023-03-08 NOTE — PROGRESS NOTES
DATE:  3/8/2023    REASON FOR FOLLOW UP: NOLAN    REFERRING PHYSICIAN:  ALINE Vora    CHIEF COMPLAINT:  Follow up of NOLAN    TREATMENT:  1. Oral iron-unable to tolerate due to significant GI SE    2. IV Feraheme      3. SL B12 replacement-started mid-September 2019    HISTORY OF PRESENT ILLNESS:   Kadi Awad is a very pleasant 52 y.o. female who is being seen today at the request of ALINE Vora for evaluation and treatment of NOLAN. Ms. Awad reports following with her PCP as needed and typically has blood testing done every 6 months to a year. She reports she has been struggling with NOLAN over the past year. Otherwise, she was not aware of this before as she doesn't follow up routinely. She says she feels exhausted all the time. She also has shortness of breath on exertion. She previously tried taking oral iron therapy but she says this causes significant nausea and she is unable to tolerate it. At present, she is not taking any oral iron. She says she did receive one IV iron infusion at the end of July per PCP. She denies having menorrhagia. Denies melena, rectal bleeding or hematuria. She has never had a screening colonoscopy. She denies any other complaints today.     INTERVAL HISTORY:  Ms. Awad presents today for follow up of NOLAN. Overall, she reports that she has been doing well since her last visit. She was last replaced with IV Feraheme in January 2020.  She reports that in November after returning from a trip to [place] she developed nausea/vomiting for several days. She presented to [facility] ED and she was airlifted to  for laparoscopic paraesophageal hernia repair with mesh, reduction of gastric volvulus and gastropexy for gastric outlet obstruction. At present, she has completely recovered and is doing well in that regard. However, she does report increasing fatigue and has noticed that her hair is falling out. She reports the last time her iron was low her hair started falling out.  She denies shortness of breath on exertion, chest pain or dizziness. She continues to have normal menstrual cycles. She denies melena, rectal bleeding or hematuria. She denies any other specific complaints today.           PAST MEDICAL HISTORY:  Past Medical History:   Diagnosis Date   • GERD (gastroesophageal reflux disease)    • History of transfusion    • Hypertension    • Lymphedema    • Scoliosis        PAST SURGICAL HISTORY:  Past Surgical History:   Procedure Laterality Date   • BREAST BIOPSY Left 5/31/2022    Procedure: BREAST BIOPSY WITH NEEDLE LOCALIZATION;  Surgeon: Lauren Lucas MD;  Location: [facility];  Service: General;  Laterality: Left;   • COLONOSCOPY     • ENDOSCOPY     • SPINE SURGERY      steel froy for scoliosis       FAMILY HISTORY:  Family History   Problem Relation Age of Onset   • Hyperlipidemia Mother    • Hyperlipidemia Father    • Breast cancer Neg Hx        SOCIAL HISTORY:  Social History     Socioeconomic History   • Marital status: Single   Tobacco Use   • Smoking status: Never   • Smokeless tobacco: Never   Vaping Use   • Vaping Use: Never used   Substance and Sexual Activity   • Alcohol use: No   • Drug use: No   • Sexual activity: Defer     MEDICATIONS:  The current medication list was reviewed in the EMR    Current Outpatient Medications:   •  HYDROcodone-acetaminophen (Norco) 7.5-325 MG per tablet, Take 1 tablet by mouth 4 (Four) Times a Day As Needed for Moderate Pain., Disp: 8 tablet, Rfl: 0  •  lisinopril-hydrochlorothiazide (PRINZIDE,ZESTORETIC) 20-12.5 MG per tablet, Take 1 tablet by mouth Daily., Disp: , Rfl:   •  loratadine (CLARITIN) 10 MG tablet, Take 1 tablet by mouth Daily., Disp: , Rfl:   •  pantoprazole (PROTONIX) 40 MG EC tablet, Take 1 tablet by mouth Daily., Disp: , Rfl:   •  Pyridoxine HCl (B-6 PO), Take  by mouth Daily., Disp: , Rfl:   •  Turmeric 500 MG capsule, Take 1 capsule by mouth Daily., Disp: , Rfl:   •  vitamin B-12 (CYANOCOBALAMIN) 100 MCG tablet,  "Take 50 mcg by mouth Daily., Disp: , Rfl:     ALLERGIES:    Allergies   Allergen Reactions   • Minocycline-Acne Care Products Other (See Comments) and Dizziness     headaches   • Tetracyclines & Related Unknown - Low Severity             REVIEW OF SYSTEMS:    A comprehensive 14 point review of systems was performed.  Significant findings as mentioned above.  All other systems reviewed and are negative.      Physical Exam   Vital Signs: /73   Pulse 79   Temp 97.8 °F (36.6 °C)   Resp 18   Ht 152.4 cm (60\")   Wt 102 kg (224 lb)   SpO2 99%   BMI 43.75 kg/m²      ECOG score: 0   General: Well developed, well nourished, alert and oriented x 3, in no acute distress.  Head: ATNC   Eyes: PERRL, No evidence of conjunctivitis.   Nose: No nasal discharge.   Mouth: Oral mucosal membranes moist. No oral ulceration or hemorrhages.   Neck: Neck supple. No thyromegaly. No JVD.   Lungs: Clear in all fields to A&P without rales, rhonchi or wheezing.   Heart: S1, S2. No murmurs, rubs, or gallops.   Abdomen: Soft. Bowel sounds are normoactive. Nontender with palpation.    Extremities: No cyanosis or edema.   Integumentary: Warm, dry, intact.  Neurologic: Grossly non-focal exam      Pain Score:  Pain Score    03/08/23 1412   PainSc: 0-No pain     RECENT LABS:  Lab Results   Component Value Date    WBC 9.25 11/17/2022    HGB 10.9 (L) 11/17/2022    HCT 34.7 11/17/2022    MCV 93 11/17/2022    RDW 12.8 11/17/2022     11/17/2022    NEUTRORELPCT 90.0 11/13/2022    LYMPHORELPCT 4.0 11/13/2022    MONORELPCT 5.0 11/13/2022    EOSRELPCT 0.0 11/13/2022    BASORELPCT 0.0 11/13/2022    NEUTROABS 16.79 (H) 11/13/2022    LYMPHSABS 0.70 (L) 11/13/2022       Lab Results   Component Value Date     11/13/2022    K 3.2 (L) 11/13/2022    CO2 25.1 11/13/2022    CL 99 11/13/2022    BUN 18 11/13/2022    CREATININE 0.72 11/13/2022    EGFRIFNONA 123 07/29/2021    GLUCOSE 172 (H) 11/13/2022    CALCIUM 10.2 11/13/2022    ALKPHOS 77 " 11/13/2022    AST 29 11/13/2022    ALT 25 11/13/2022    BILITOT 0.4 11/13/2022    ALBUMIN 4.37 11/13/2022    PROTEINTOT 7.9 11/13/2022    MG 1.9 11/17/2022      Lab Results   Component Value Date    FERRITIN 30.01 02/09/2022    IRON 101 02/09/2022    TIBC 392 02/09/2022    LABIRON 26 02/09/2022    IABKZPOM22 >2,000 (H) 02/09/2022    FOLATE 17.10 09/17/2019    HAPTOGLOBIN 177 09/17/2019    RETICCTPCT 1.19 09/17/2019    RETIC 0.0516 09/17/2019     Workup 9/17/19    Iron  37 - 145 mcg/dL 13 Abnormally low     Iron Saturation  20 - 50 % 3 Abnormally low     Transferrin  200 - 360 mg/dL 314    TIBC  298 - 536 mcg/dL 468      Ferritin  13.00 - 150.00 ng/mL 3.92 Abnormally low       Vitamin B-12  211 - 946 pg/mL 253      Folate  4.78 - 24.20 ng/mL 17.10      Reticulocyte %  0.70 - 1.90 % 1.19    Reticulocyte Absolute  0.0200 - 0.1300 10*6/mm3 0.0516      LDH  135 - 214 U/L 162      Haptoglobin  30 - 200 mg/dL 177      TSH  0.270 - 4.200 uIU/mL 1.660      Sed Rate  0 - 20 mm/hr 22 Abnormally high       C-Reactive Protein  0.00 - 0.50 mg/dL 0.11      Copper  72 - 166 ug/dL 132      Zinc  56 - 134 ug/dL 59      Endoscopy:    Colonoscopy 02-10-20      Pathology:  02-10-20      ASSESSMENT & PLAN:  Kadi Awad is a very pleasant 52 y.o. female with    1.  Iron deficiency anemia:  - Ms. Awad typically follows with PCP only as needed with blood testing every 6 months to a year. She reports she has been struggling with NOLAN over the past year. Previous available labs reviewed.   - Unfortunately, she is unable to tolerate oral iron due to significant GI SE.  She denies having melena, rectal bleeding or hematuria. Denies menorrhagia.   - Obtained repeat CBC on initial consultation which showed stable Hg 9.3, platelets were elevated (likely reactive to NOLAN). Repeat iron studies remained very low. Therefore, we have been replacing with IV Feraheme as needed.    - She was referred to GI and had EGD done which was reportedly negative  for bleeding. However, she says she has a hiatal hernia and was started on a PPI. She was also positive for H. Pylori and completed a course of antibiotics. She underwent colonoscopy on Monday which she says was negative for bleeding but did have 4 polyps removed. Pathology summarized above and was negative for high grade dysplasia or malignancy.   - Also previously checked additional labs to further evaluate anemia including PBS as above. There was no evidence of hemolysis, B12 was low therefore, started replacement as below,  Folate replete, TSH was normal, ESR was mildly elevated and suggestive of underlying inflammation, CRP was normal, Copper and Zinc were normal. Tissue Transglutaminase was normal.   - She was last replaced with IV Feraheme in January 2020.  - CBC from today continues to show normalized Hg. Iron panel and ferritin are marginally low. Therefore, will replace with IV Feraheme pending insurance approval.   - Will follow up in 3 months with CBC, Iron panel and Ferritin.       2. B12 deficiency:  - She is taking SL B12 2500 mcg daily as advised. B12 now replete.   - B12 level pending from today.  - Will monitor.    3. Thrombocytosis:  -Likely reactive and secondary to NOLAN. Platelet count normalized following replacement with IV iron. Will monitor.       ACO / ANDRE/Other  Quality measures  -  Kadi Awad received 2022 flu vaccine.  -  Kadi Awad reports a pain score of 0.    -  Current outpatient and discharge medications have been reconciled for the patient.  Reviewed by: LEILANI Barboza            The patient was in agreement with the plan and all questions were answered to her satisfaction.     Thank you so much for allowing us to participate in the care of Kadi Awad . Please do not hesitate to contact us with any questions or concerns.     A total of 30 minutes were spent coordinating this patient’s care in clinic today; more than 50% of this time was face-to-face with the  patient, reviewing her medical history and counseling on the current treatment and followup plan. All questions were answered to her satisfaction.      Electronically Signed by: LEILANI Newman , March 8, 2023 14:22 EST       CC:   Melodie Mejias PA

## 2023-03-09 LAB — VIT B12 BLD-MCNC: >2000 PG/ML (ref 211–946)

## 2023-03-14 ENCOUNTER — INFUSION (OUTPATIENT)
Dept: ONCOLOGY | Facility: HOSPITAL | Age: 53
End: 2023-03-14
Payer: COMMERCIAL

## 2023-03-14 VITALS
BODY MASS INDEX: 40.62 KG/M2 | WEIGHT: 208 LBS | SYSTOLIC BLOOD PRESSURE: 120 MMHG | DIASTOLIC BLOOD PRESSURE: 77 MMHG | RESPIRATION RATE: 18 BRPM | OXYGEN SATURATION: 98 % | HEART RATE: 86 BPM | TEMPERATURE: 98 F

## 2023-03-14 DIAGNOSIS — K90.9 MALABSORPTION OF IRON: ICD-10-CM

## 2023-03-14 DIAGNOSIS — D50.9 IRON DEFICIENCY ANEMIA, UNSPECIFIED IRON DEFICIENCY ANEMIA TYPE: Primary | ICD-10-CM

## 2023-03-14 PROCEDURE — 25010000002 FERUMOXYTOL 510 MG/17ML SOLUTION: Performed by: NURSE PRACTITIONER

## 2023-03-14 PROCEDURE — 96365 THER/PROPH/DIAG IV INF INIT: CPT

## 2023-03-14 PROCEDURE — 96374 THER/PROPH/DIAG INJ IV PUSH: CPT

## 2023-03-14 RX ORDER — SODIUM CHLORIDE 9 MG/ML
250 INJECTION, SOLUTION INTRAVENOUS ONCE
Status: COMPLETED | OUTPATIENT
Start: 2023-03-14 | End: 2023-03-14

## 2023-03-14 RX ADMIN — FERUMOXYTOL 510 MG: 510 INJECTION INTRAVENOUS at 08:49

## 2023-03-14 RX ADMIN — SODIUM CHLORIDE 250 ML: 9 INJECTION, SOLUTION INTRAVENOUS at 08:48

## 2023-03-24 ENCOUNTER — INFUSION (OUTPATIENT)
Dept: ONCOLOGY | Facility: HOSPITAL | Age: 53
End: 2023-03-24
Payer: COMMERCIAL

## 2023-03-24 VITALS
OXYGEN SATURATION: 98 % | DIASTOLIC BLOOD PRESSURE: 78 MMHG | HEART RATE: 108 BPM | TEMPERATURE: 97.5 F | SYSTOLIC BLOOD PRESSURE: 119 MMHG | RESPIRATION RATE: 18 BRPM

## 2023-03-24 DIAGNOSIS — D50.9 IRON DEFICIENCY ANEMIA, UNSPECIFIED IRON DEFICIENCY ANEMIA TYPE: Primary | ICD-10-CM

## 2023-03-24 DIAGNOSIS — K90.9 MALABSORPTION OF IRON: ICD-10-CM

## 2023-03-24 PROCEDURE — 25010000002 FERUMOXYTOL 510 MG/17ML SOLUTION: Performed by: NURSE PRACTITIONER

## 2023-03-24 PROCEDURE — 96365 THER/PROPH/DIAG IV INF INIT: CPT

## 2023-03-24 PROCEDURE — 96374 THER/PROPH/DIAG INJ IV PUSH: CPT

## 2023-03-24 RX ORDER — SODIUM CHLORIDE 9 MG/ML
250 INJECTION, SOLUTION INTRAVENOUS ONCE
Status: COMPLETED | OUTPATIENT
Start: 2023-03-24 | End: 2023-03-24

## 2023-03-24 RX ADMIN — SODIUM CHLORIDE 250 ML: 9 INJECTION, SOLUTION INTRAVENOUS at 15:38

## 2023-03-24 RX ADMIN — FERUMOXYTOL 510 MG: 510 INJECTION INTRAVENOUS at 15:38

## 2023-06-08 ENCOUNTER — LAB (OUTPATIENT)
Dept: ONCOLOGY | Facility: CLINIC | Age: 53
End: 2023-06-08
Payer: COMMERCIAL

## 2023-06-08 ENCOUNTER — OFFICE VISIT (OUTPATIENT)
Dept: ONCOLOGY | Facility: CLINIC | Age: 53
End: 2023-06-08
Payer: COMMERCIAL

## 2023-06-08 VITALS
OXYGEN SATURATION: 98 % | WEIGHT: 215.6 LBS | SYSTOLIC BLOOD PRESSURE: 124 MMHG | BODY MASS INDEX: 42.33 KG/M2 | HEART RATE: 103 BPM | RESPIRATION RATE: 18 BRPM | HEIGHT: 60 IN | TEMPERATURE: 97.8 F | DIASTOLIC BLOOD PRESSURE: 89 MMHG

## 2023-06-08 DIAGNOSIS — E53.8 B12 DEFICIENCY: ICD-10-CM

## 2023-06-08 DIAGNOSIS — K90.9 MALABSORPTION OF IRON: ICD-10-CM

## 2023-06-08 DIAGNOSIS — D50.9 IRON DEFICIENCY ANEMIA, UNSPECIFIED IRON DEFICIENCY ANEMIA TYPE: ICD-10-CM

## 2023-06-08 DIAGNOSIS — D50.9 IRON DEFICIENCY ANEMIA, UNSPECIFIED IRON DEFICIENCY ANEMIA TYPE: Primary | ICD-10-CM

## 2023-06-08 LAB
BASOPHILS # BLD AUTO: 0.06 10*3/MM3 (ref 0–0.2)
BASOPHILS NFR BLD AUTO: 1 % (ref 0–1.5)
DEPRECATED RDW RBC AUTO: 46.9 FL (ref 37–54)
EOSINOPHIL # BLD AUTO: 0.03 10*3/MM3 (ref 0–0.4)
EOSINOPHIL NFR BLD AUTO: 0.5 % (ref 0.3–6.2)
ERYTHROCYTE [DISTWIDTH] IN BLOOD BY AUTOMATED COUNT: 13.3 % (ref 12.3–15.4)
FERRITIN SERPL-MCNC: 142.9 NG/ML (ref 13–150)
HCT VFR BLD AUTO: 41.6 % (ref 34–46.6)
HGB BLD-MCNC: 13.9 G/DL (ref 12–15.9)
IMM GRANULOCYTES # BLD AUTO: 0.05 10*3/MM3 (ref 0–0.05)
IMM GRANULOCYTES NFR BLD AUTO: 0.8 % (ref 0–0.5)
IRON 24H UR-MRATE: 88 MCG/DL (ref 37–145)
IRON SATN MFR SERPL: 29 % (ref 20–50)
LYMPHOCYTES # BLD AUTO: 1.63 10*3/MM3 (ref 0.7–3.1)
LYMPHOCYTES NFR BLD AUTO: 27.4 % (ref 19.6–45.3)
MCH RBC QN AUTO: 31.7 PG (ref 26.6–33)
MCHC RBC AUTO-ENTMCNC: 33.4 G/DL (ref 31.5–35.7)
MCV RBC AUTO: 94.8 FL (ref 79–97)
MONOCYTES # BLD AUTO: 0.5 10*3/MM3 (ref 0.1–0.9)
MONOCYTES NFR BLD AUTO: 8.4 % (ref 5–12)
NEUTROPHILS NFR BLD AUTO: 3.67 10*3/MM3 (ref 1.7–7)
NEUTROPHILS NFR BLD AUTO: 61.9 % (ref 42.7–76)
NRBC BLD AUTO-RTO: 0 /100 WBC (ref 0–0.2)
PLATELET # BLD AUTO: 358 10*3/MM3 (ref 140–450)
PMV BLD AUTO: 9.6 FL (ref 6–12)
RBC # BLD AUTO: 4.39 10*6/MM3 (ref 3.77–5.28)
TIBC SERPL-MCNC: 299 MCG/DL (ref 298–536)
TRANSFERRIN SERPL-MCNC: 201 MG/DL (ref 200–360)
WBC NRBC COR # BLD: 5.94 10*3/MM3 (ref 3.4–10.8)

## 2023-06-08 PROCEDURE — 99214 OFFICE O/P EST MOD 30 MIN: CPT | Performed by: NURSE PRACTITIONER

## 2023-06-08 PROCEDURE — 82728 ASSAY OF FERRITIN: CPT | Performed by: NURSE PRACTITIONER

## 2023-06-08 PROCEDURE — 85025 COMPLETE CBC W/AUTO DIFF WBC: CPT | Performed by: NURSE PRACTITIONER

## 2023-06-08 PROCEDURE — 83540 ASSAY OF IRON: CPT | Performed by: NURSE PRACTITIONER

## 2023-06-08 PROCEDURE — 84466 ASSAY OF TRANSFERRIN: CPT | Performed by: NURSE PRACTITIONER

## 2023-06-08 NOTE — PROGRESS NOTES
DATE:  6/8/2023    REASON FOR FOLLOW UP: NOLAN    REFERRING PHYSICIAN:  ALINE Vora    CHIEF COMPLAINT:  Follow up of NOLAN    TREATMENT:  1. Oral iron-unable to tolerate due to significant GI SE    2. IV Feraheme      3. SL B12 replacement-started mid-September 2019    HISTORY OF PRESENT ILLNESS:   Kadi Awad is a very pleasant 52 y.o. female who is being seen today at the request of ALINE Vora for evaluation and treatment of NOLAN. Ms. Awad reports following with her PCP as needed and typically has blood testing done every 6 months to a year. She reports she has been struggling with NOLAN over the past year. Otherwise, she was not aware of this before as she doesn't follow up routinely. She says she feels exhausted all the time. She also has shortness of breath on exertion. She previously tried taking oral iron therapy but she says this causes significant nausea and she is unable to tolerate it. At present, she is not taking any oral iron. She says she did receive one IV iron infusion at the end of July per PCP. She denies having menorrhagia. Denies melena, rectal bleeding or hematuria. She has never had a screening colonoscopy. She denies any other complaints today.     INTERVAL HISTORY:  Ms. Awad presents today for follow up of iron deficiency anemia. She states that she  has been doing well since receiving IV Feraheme, which was last given in March 2023. Sh states that that she has recovered well since her hernia repair. She reports fatigue is resolved and hair is no longer falling out. She denies shortness of breath with exertion, chest pain, dizziness. She is without further specific complaints today.    PAST MEDICAL HISTORY:  Past Medical History:   Diagnosis Date    GERD (gastroesophageal reflux disease)     History of transfusion     Hypertension     Lymphedema     Scoliosis      PAST SURGICAL HISTORY:  Past Surgical History:   Procedure Laterality Date    BREAST BIOPSY Left  "5/31/2022    Procedure: BREAST BIOPSY WITH NEEDLE LOCALIZATION;  Surgeon: Lauren Lucas MD;  Location: [facility];  Service: General;  Laterality: Left;    COLONOSCOPY      ENDOSCOPY      SPINE SURGERY      steel froy for scoliosis     FAMILY HISTORY:  Family History   Problem Relation Age of Onset    Hyperlipidemia Mother     Hyperlipidemia Father     Breast cancer Neg Hx      SOCIAL HISTORY:  Social History     Socioeconomic History    Marital status: Single   Tobacco Use    Smoking status: Never    Smokeless tobacco: Never   Vaping Use    Vaping Use: Never used   Substance and Sexual Activity    Alcohol use: No    Drug use: No    Sexual activity: Defer     MEDICATIONS:  The current medication list was reviewed in the EMR    Current Outpatient Medications:     HYDROcodone-acetaminophen (Norco) 7.5-325 MG per tablet, Take 1 tablet by mouth 4 (Four) Times a Day As Needed for Moderate Pain., Disp: 8 tablet, Rfl: 0    lisinopril-hydrochlorothiazide (PRINZIDE,ZESTORETIC) 20-12.5 MG per tablet, Take 1 tablet by mouth Daily., Disp: , Rfl:     loratadine (CLARITIN) 10 MG tablet, Take 1 tablet by mouth Daily., Disp: , Rfl:     pantoprazole (PROTONIX) 40 MG EC tablet, Take 1 tablet by mouth Daily., Disp: , Rfl:     Pyridoxine HCl (B-6 PO), Take  by mouth Daily., Disp: , Rfl:     Turmeric 500 MG capsule, Take 1 capsule by mouth Daily., Disp: , Rfl:     vitamin B-12 (CYANOCOBALAMIN) 100 MCG tablet, Take 50 mcg by mouth Daily., Disp: , Rfl:     ALLERGIES:    Allergies   Allergen Reactions    Minocycline-Acne Care Products Other (See Comments) and Dizziness     headaches    Tetracyclines & Related Unknown - Low Severity     REVIEW OF SYSTEMS:    A comprehensive 14 point review of systems was performed.  Significant findings as mentioned above.  All other systems reviewed and are negative.      Physical Exam   Vital Signs: /89   Pulse 103   Temp 97.8 °F (36.6 °C) (Temporal)   Resp 18   Ht 152.4 cm (60\")   Wt 97.8 " kg (215 lb 9.6 oz)   SpO2 98%   BMI 42.11 kg/m²      ECOG score: 0   General: Well developed, well nourished, alert and oriented x 3, in no acute distress.  Head: ATNC   Eyes: PERRL, No evidence of conjunctivitis.   Nose: No nasal discharge.   Mouth: Oral mucosal membranes moist. No oral ulceration or hemorrhages.   Neck: Neck supple. No thyromegaly. No JVD.   Lungs: Clear in all fields to A&P without rales, rhonchi or wheezing.   Heart: S1, S2. No murmurs, rubs, or gallops.   Abdomen: Soft. Bowel sounds are normoactive. Nontender with palpation.    Extremities: No cyanosis or edema.   Integumentary: Warm, dry, intact.  Neurologic: Grossly non-focal exam      Pain Score:  Pain Score    06/08/23 1335   PainSc: 0-No pain     Endoscopy:    Colonoscopy 02-10-20      Pathology:  02-10-20    RECENT LABS:  Workup 9/17/19      Lab Results   Component Value Date    COPPER 132 09/17/2019     Lab Results   Component Value Date    ZINC 59 09/17/2019     Lab Results   Component Value Date    HAPTOGLOBIN 177 09/17/2019     Lab Results   Component Value Date     09/17/2019     Lab Results   Component Value Date    TSH 1.660 09/17/2019     Lab Results   Component Value Date    SEDRATE 22 (H) 09/17/2019       Lab Results   Component Value Date    WBC 5.94 06/08/2023    HGB 13.9 06/08/2023    HCT 41.6 06/08/2023    MCV 94.8 06/08/2023    RDW 13.3 06/08/2023     06/08/2023    NEUTRORELPCT 61.9 06/08/2023    LYMPHORELPCT 27.4 06/08/2023    MONORELPCT 8.4 06/08/2023    EOSRELPCT 0.5 06/08/2023    BASORELPCT 1.0 06/08/2023    NEUTROABS 3.67 06/08/2023    LYMPHSABS 1.63 06/08/2023     Lab Results   Component Value Date     11/13/2022    K 3.2 (L) 11/13/2022    CO2 25.1 11/13/2022    CL 99 11/13/2022    BUN 18 11/13/2022    CREATININE 0.72 11/13/2022    EGFRIFNONA 123 07/29/2021    GLUCOSE 172 (H) 11/13/2022    CALCIUM 10.2 11/13/2022    ALKPHOS 77 11/13/2022    AST 29 11/13/2022    ALT 25 11/13/2022    BILITOT 0.4  11/13/2022    ALBUMIN 4.37 11/13/2022    PROTEINTOT 7.9 11/13/2022    MG 1.9 11/17/2022      Lab Results   Component Value Date    FERRITIN 142.90 06/08/2023    IRON 88 06/08/2023    TIBC 299 06/08/2023    LABIRON 29 06/08/2023    [id number] >2,000 (H) 03/08/2023    FOLATE 17.10 09/17/2019    HAPTOGLOBIN 177 09/17/2019    RETICCTPCT 1.19 09/17/2019    RETIC 0.0516 09/17/2019     ASSESSMENT & PLAN:  Kadi Awad is a very pleasant 52 y.o. female with    1.  Iron deficiency anemia:  - Ms. Awad typically follows with PCP only as needed with blood testing every 6 months to a year. She reports she has been struggling with NOLAN over the past year. Previous available labs reviewed.   - Unfortunately, she is unable to tolerate oral iron due to significant GI SE.  She denies having melena, rectal bleeding or hematuria. Denies menorrhagia.   - Obtained repeat CBC on initial consultation which showed stable Hg 9.3, platelets were elevated (likely reactive to NOLAN). Repeat iron studies remained very low. Therefore, we have been replacing with IV Feraheme as needed.    - She was referred to GI and had EGD done which was reportedly negative for bleeding. However, she says she has a hiatal hernia and was started on a PPI. Last colonoscopy in 2020 was with four polyps removed that were negative for dysplasia.   - She had hernia repair in late 2022 from which she has recovered without difficulties.  - Also previously checked additional labs to further evaluate anemia including PBS as above. There was no evidence of hemolysis, B12 was low therefore, started replacement as below,  Folate replete, TSH was normal, ESR was mildly elevated and suggestive of underlying inflammation, CRP was normal, Copper and Zinc were normal. Tissue Transglutaminase was normal.   - She was last replaced with IV Feraheme in March 2023.   - CBC from today continues to show normalized Hg (13.9). Iron profile with iron 88, iron satuation 29%, TIBC 299.  Ferritin (142) is within normal .  - Will follow up in 6 months with CBC, iron profile, ferritin and vitamin B12.     2. B12 deficiency:  - She is taking SL B12 2500 mcg daily as advised. B12 now replete.   - Will monitor.    3. Thrombocytosis:  -Likely reactive and secondary to NOLAN. Platelet count normalized following replacement with IV iron.   - CBC with platelet count 358,000.    The patient was in agreement with the plan and all questions were answered to her satisfaction.         ACO / ANDRE/Other  Quality measures  -  Kadi Awad received 2022 flu vaccine.  -  Kadi Awad reports a pain score of 0.    -  Current outpatient and discharge medications have been reconciled for the patient.  Reviewed by: LEILANI Rojo    Thank you so much for allowing us to participate in the care of Kadi Awad . Please do not hesitate to contact us with any questions or concerns.       Electronically Signed by: LEILANI Nelson , June 8, 2023 14:27 EDT       CC:   Melodie Mejias PA    I spent 30 minutes with Kadi Awad today.  In the office today, more than 50% of this time was spent face-to-face with her  in counseling / coordination of care, reviewing her interim medical history and counseling on the current treatment plan.  All questions were answered to her satisfaction.

## 2023-06-08 NOTE — PROGRESS NOTES
Venipuncture Blood Specimen Collection  Venipuncture performed in left arm by Jose J Castelan MA with good hemostasis. Patient tolerated the procedure well without complications.   06/08/23   Jose J Castelan MA

## 2023-10-17 ENCOUNTER — TRANSCRIBE ORDERS (OUTPATIENT)
Dept: ADMINISTRATIVE | Facility: HOSPITAL | Age: 53
End: 2023-10-17
Payer: COMMERCIAL

## 2023-10-17 DIAGNOSIS — R10.12 LEFT UPPER QUADRANT PAIN: Primary | ICD-10-CM

## 2023-10-31 ENCOUNTER — HOSPITAL ENCOUNTER (OUTPATIENT)
Dept: ULTRASOUND IMAGING | Facility: HOSPITAL | Age: 53
Discharge: HOME | End: 2023-10-31
Admitting: SURGERY
Payer: COMMERCIAL

## 2023-10-31 DIAGNOSIS — R10.12 LEFT UPPER QUADRANT PAIN: ICD-10-CM

## 2023-10-31 PROCEDURE — 76705 ECHO EXAM OF ABDOMEN: CPT | Performed by: RADIOLOGY

## 2023-10-31 PROCEDURE — 76705 ECHO EXAM OF ABDOMEN: CPT

## 2023-12-11 ENCOUNTER — LAB (OUTPATIENT)
Dept: ONCOLOGY | Facility: CLINIC | Age: 53
End: 2023-12-11
Payer: COMMERCIAL

## 2023-12-11 ENCOUNTER — OFFICE VISIT (OUTPATIENT)
Dept: ONCOLOGY | Facility: CLINIC | Age: 53
End: 2023-12-11
Payer: COMMERCIAL

## 2023-12-11 VITALS
WEIGHT: 226.4 LBS | HEIGHT: 60 IN | HEART RATE: 76 BPM | SYSTOLIC BLOOD PRESSURE: 106 MMHG | TEMPERATURE: 97.1 F | RESPIRATION RATE: 18 BRPM | BODY MASS INDEX: 44.45 KG/M2 | OXYGEN SATURATION: 96 % | DIASTOLIC BLOOD PRESSURE: 71 MMHG

## 2023-12-11 DIAGNOSIS — K90.9 MALABSORPTION OF IRON: ICD-10-CM

## 2023-12-11 DIAGNOSIS — D50.9 IRON DEFICIENCY ANEMIA, UNSPECIFIED IRON DEFICIENCY ANEMIA TYPE: Primary | ICD-10-CM

## 2023-12-11 DIAGNOSIS — D50.9 IRON DEFICIENCY ANEMIA, UNSPECIFIED IRON DEFICIENCY ANEMIA TYPE: ICD-10-CM

## 2023-12-11 DIAGNOSIS — E53.8 B12 DEFICIENCY: ICD-10-CM

## 2023-12-11 LAB
BASOPHILS # BLD AUTO: 0.07 10*3/MM3 (ref 0–0.2)
BASOPHILS NFR BLD AUTO: 1 % (ref 0–1.5)
DEPRECATED RDW RBC AUTO: 45.2 FL (ref 37–54)
EOSINOPHIL # BLD AUTO: 0.03 10*3/MM3 (ref 0–0.4)
EOSINOPHIL NFR BLD AUTO: 0.4 % (ref 0.3–6.2)
ERYTHROCYTE [DISTWIDTH] IN BLOOD BY AUTOMATED COUNT: 13.3 % (ref 12.3–15.4)
FERRITIN SERPL-MCNC: 90.35 NG/ML (ref 13–150)
HCT VFR BLD AUTO: 42.1 % (ref 34–46.6)
HGB BLD-MCNC: 13.8 G/DL (ref 12–15.9)
IMM GRANULOCYTES # BLD AUTO: 0.05 10*3/MM3 (ref 0–0.05)
IMM GRANULOCYTES NFR BLD AUTO: 0.7 % (ref 0–0.5)
IRON 24H UR-MRATE: 60 MCG/DL (ref 37–145)
IRON SATN MFR SERPL: 18 % (ref 20–50)
LYMPHOCYTES # BLD AUTO: 1.99 10*3/MM3 (ref 0.7–3.1)
LYMPHOCYTES NFR BLD AUTO: 29.4 % (ref 19.6–45.3)
MCH RBC QN AUTO: 30 PG (ref 26.6–33)
MCHC RBC AUTO-ENTMCNC: 32.8 G/DL (ref 31.5–35.7)
MCV RBC AUTO: 91.5 FL (ref 79–97)
MONOCYTES # BLD AUTO: 0.61 10*3/MM3 (ref 0.1–0.9)
MONOCYTES NFR BLD AUTO: 9 % (ref 5–12)
NEUTROPHILS NFR BLD AUTO: 4.02 10*3/MM3 (ref 1.7–7)
NEUTROPHILS NFR BLD AUTO: 59.5 % (ref 42.7–76)
NRBC BLD AUTO-RTO: 0 /100 WBC (ref 0–0.2)
PLATELET # BLD AUTO: 364 10*3/MM3 (ref 140–450)
PMV BLD AUTO: 9.5 FL (ref 6–12)
RBC # BLD AUTO: 4.6 10*6/MM3 (ref 3.77–5.28)
TIBC SERPL-MCNC: 343 MCG/DL (ref 298–536)
TRANSFERRIN SERPL-MCNC: 230 MG/DL (ref 200–360)
WBC NRBC COR # BLD AUTO: 6.77 10*3/MM3 (ref 3.4–10.8)

## 2023-12-11 PROCEDURE — 83540 ASSAY OF IRON: CPT | Performed by: NURSE PRACTITIONER

## 2023-12-11 PROCEDURE — 99214 OFFICE O/P EST MOD 30 MIN: CPT | Performed by: NURSE PRACTITIONER

## 2023-12-11 PROCEDURE — 82728 ASSAY OF FERRITIN: CPT | Performed by: NURSE PRACTITIONER

## 2023-12-11 PROCEDURE — 84466 ASSAY OF TRANSFERRIN: CPT | Performed by: NURSE PRACTITIONER

## 2023-12-11 PROCEDURE — 36415 COLL VENOUS BLD VENIPUNCTURE: CPT | Performed by: NURSE PRACTITIONER

## 2023-12-11 PROCEDURE — 85025 COMPLETE CBC W/AUTO DIFF WBC: CPT | Performed by: NURSE PRACTITIONER

## 2023-12-11 PROCEDURE — 82607 VITAMIN B-12: CPT | Performed by: NURSE PRACTITIONER

## 2023-12-11 NOTE — PROGRESS NOTES
Venipuncture Blood Specimen Collection  Venipuncture performed in left arm by Haritha Villalba MA with good hemostasis. Patient tolerated the procedure well without complications.   12/11/23   Haritha Villalba MA

## 2023-12-11 NOTE — PROGRESS NOTES
DATE:  12/11/2023    REASON FOR FOLLOW UP: NOLAN    REFERRING PHYSICIAN:  ALINE Vora    CHIEF COMPLAINT:  Follow up of NOLAN    TREATMENT:  1. Oral iron-unable to tolerate due to significant GI SE    2. IV Feraheme      3. SL B12 replacement-started mid-September 2019    HISTORY OF PRESENT ILLNESS:   Kadi Awad is a very pleasant 53 y.o. female who is being seen today at the request of ALINE Vora for evaluation and treatment of NOLAN. Ms. Awad reports following with her PCP as needed and typically has blood testing done every 6 months to a year. She reports she has been struggling with NOLAN over the past year. Otherwise, she was not aware of this before as she doesn't follow up routinely. She says she feels exhausted all the time. She also has shortness of breath on exertion. She previously tried taking oral iron therapy but she says this causes significant nausea and she is unable to tolerate it. At present, she is not taking any oral iron. She says she did receive one IV iron infusion at the end of July per PCP. She denies having menorrhagia. Denies melena, rectal bleeding or hematuria. She has never had a screening colonoscopy. She denies any other complaints today.     INTERVAL HISTORY:  Ms. Awad presents today for follow up of iron deficiency anemia. She states that she  has been doing well since receiving IV Feraheme, which was last given in March 2023. She denies fatigue, shortness of breath on exertion, chest pain, dizziness. She denies any obvious blood loss from any source. She is no longer having menstrual cycles. She is without further specific complaints today.    PAST MEDICAL HISTORY:  Past Medical History:   Diagnosis Date    GERD (gastroesophageal reflux disease)     History of transfusion     Hypertension     Lymphedema     Scoliosis      PAST SURGICAL HISTORY:  Past Surgical History:   Procedure Laterality Date    BREAST BIOPSY Left 5/31/2022    Procedure: BREAST BIOPSY  "WITH NEEDLE LOCALIZATION;  Surgeon: Lauren Lucas MD;  Location: [facility];  Service: General;  Laterality: Left;    COLONOSCOPY      ENDOSCOPY      SPINE SURGERY      steel froy for scoliosis     FAMILY HISTORY:  Family History   Problem Relation Age of Onset    Hyperlipidemia Mother     Hyperlipidemia Father     Breast cancer Neg Hx      SOCIAL HISTORY:  Social History     Socioeconomic History    Marital status: Single   Tobacco Use    Smoking status: Never    Smokeless tobacco: Never   Vaping Use    Vaping Use: Never used   Substance and Sexual Activity    Alcohol use: No    Drug use: No    Sexual activity: Defer     MEDICATIONS:  The current medication list was reviewed in the EMR    Current Outpatient Medications:     lisinopril-hydrochlorothiazide (PRINZIDE,ZESTORETIC) 20-12.5 MG per tablet, Take 1 tablet by mouth Daily., Disp: , Rfl:     loratadine (CLARITIN) 10 MG tablet, Take 1 tablet by mouth Daily., Disp: , Rfl:     pantoprazole (PROTONIX) 40 MG EC tablet, Take 1 tablet by mouth Daily., Disp: , Rfl:     Pyridoxine HCl (B-6 PO), Take  by mouth Daily., Disp: , Rfl:     Turmeric 500 MG capsule, Take 1 capsule by mouth Daily., Disp: , Rfl:     vitamin B-12 (CYANOCOBALAMIN) 100 MCG tablet, Take 0.5 tablets by mouth Daily., Disp: , Rfl:     HYDROcodone-acetaminophen (Norco) 7.5-325 MG per tablet, Take 1 tablet by mouth 4 (Four) Times a Day As Needed for Moderate Pain. (Patient not taking: Reported on 12/11/2023), Disp: 8 tablet, Rfl: 0    ALLERGIES:    Allergies   Allergen Reactions    Minocycline-Acne Care Products Other (See Comments) and Dizziness     headaches    Tetracyclines & Related Unknown - Low Severity     REVIEW OF SYSTEMS:    A comprehensive 14 point review of systems was performed.  Significant findings as mentioned above.  All other systems reviewed and are negative.      Physical Exam   Vital Signs: /71   Pulse 76   Temp 97.1 °F (36.2 °C) (Temporal)   Resp 18   Ht 152.4 cm (60\")  "  Wt 103 kg (226 lb 6.4 oz)   SpO2 96%   BMI 44.22 kg/m²      ECOG score: 0   General: Well developed, well nourished, alert and oriented x 3, in no acute distress.  Head: ATNC   Eyes: PERRL, No evidence of conjunctivitis.   Nose: No nasal discharge.   Mouth: Oral mucosal membranes moist. No oral ulceration or hemorrhages.   Neck: Neck supple. No thyromegaly. No JVD.   Lungs: Clear in all fields to A&P without rales, rhonchi or wheezing.   Heart: S1, S2. No murmurs, rubs, or gallops.   Abdomen: Soft. Bowel sounds are normoactive. Nontender with palpation.    Extremities: No cyanosis or edema.   Integumentary: Warm, dry, intact.  Neurologic: Grossly non-focal exam      Pain Score:  Pain Score    12/11/23 1513   PainSc: 0-No pain       Endoscopy:    Colonoscopy 02-10-20      Pathology:  02-10-20    RECENT LABS:  Workup 9/17/19      Lab Results   Component Value Date    COPPER 132 09/17/2019     Lab Results   Component Value Date    ZINC 59 09/17/2019     Lab Results   Component Value Date    HAPTOGLOBIN 177 09/17/2019     Lab Results   Component Value Date     09/17/2019     Lab Results   Component Value Date    TSH 1.660 09/17/2019     Lab Results   Component Value Date    SEDRATE 22 (H) 09/17/2019       Lab Results   Component Value Date    WBC 6.77 12/11/2023    HGB 13.8 12/11/2023    HCT 42.1 12/11/2023    MCV 91.5 12/11/2023    RDW 13.3 12/11/2023     12/11/2023    NEUTRORELPCT 59.5 12/11/2023    LYMPHORELPCT 29.4 12/11/2023    MONORELPCT 9.0 12/11/2023    EOSRELPCT 0.4 12/11/2023    BASORELPCT 1.0 12/11/2023    NEUTROABS 4.02 12/11/2023    LYMPHSABS 1.99 12/11/2023     Lab Results   Component Value Date     11/13/2022    K 3.2 (L) 11/13/2022    CO2 25.1 11/13/2022    CL 99 11/13/2022    BUN 18 11/13/2022    CREATININE 0.72 11/13/2022    EGFRIFNONA 123 07/29/2021    GLUCOSE 172 (H) 11/13/2022    CALCIUM 10.2 11/13/2022    ALKPHOS 77 11/13/2022    AST 29 11/13/2022    ALT 25 11/13/2022     BILITOT 0.4 11/13/2022    ALBUMIN 4.37 11/13/2022    PROTEINTOT 7.9 11/13/2022    MG 1.9 11/17/2022      Lab Results   Component Value Date    FERRITIN 90.35 12/11/2023    IRON 60 12/11/2023    TIBC 343 12/11/2023    LABIRON 18 (L) 12/11/2023    [id number] >2,000 (H) 03/08/2023    FOLATE 17.10 09/17/2019    HAPTOGLOBIN 177 09/17/2019    RETICCTPCT 1.19 09/17/2019    RETIC 0.0516 09/17/2019     ASSESSMENT & PLAN:  Kadi Awad is a very pleasant 53 y.o. female with    1.  Iron deficiency anemia:  - Ms. Awad typically follows with PCP only as needed with blood testing every 6 months to a year. She reports she has been struggling with NOLAN over the past year. Previous available labs reviewed.   - Unfortunately, she is unable to tolerate oral iron due to significant GI SE.  She denies having melena, rectal bleeding or hematuria. Denies menorrhagia.   - Obtained repeat CBC on initial consultation which showed stable Hg 9.3, platelets were elevated (likely reactive to NOLAN). Repeat iron studies remained very low. Therefore, we have been replacing with IV Feraheme as needed.    - She was referred to GI and had EGD done which was reportedly negative for bleeding. However, she says she has a hiatal hernia and was started on a PPI. Last colonoscopy in 2020 was with four polyps removed that were negative for dysplasia.   - She had hernia repair in late 2022 from which she has recovered without difficulties.  - Also previously checked additional labs to further evaluate anemia including PBS as above. There was no evidence of hemolysis, B12 was low therefore, started replacement as below,  Folate replete, TSH was normal, ESR was mildly elevated and suggestive of underlying inflammation, CRP was normal, Copper and Zinc were normal. Tissue Transglutaminase was normal.   - She was last replaced with IV Feraheme in March 2023.   - CBC from today continues to show normalized Hg (13.8). Iron panel and Ferritin is replete. B12 level  pending from today.   - Advised to continue B12 SL daily.  - Will follow up in 6 months with CBC, Iron panel and Ferritin.   - She is also due for repeat colonoscopy and plans to arrange this with Dr. Gonzalez after the first of the year.    2. B12 deficiency:  - She is taking SL B12 2500 mcg daily as advised. B12 now replete.   - B12 level pending from today. Will monitor.    3. Thrombocytosis:  - Likely reactive and secondary to NOLAN. Platelet count normalized following replacement with IV iron.   - CBC with platelet count 364,000.      ACO / ANDRE/Other  Quality measures  -  Kadi Awad received 2023 flu vaccine.  -  Kadi Awad reports a pain score of 0.    -  Current outpatient and discharge medications have been reconciled for the patient.  Reviewed by: LEILANI Barboza      I spent 30 minutes caring for Kadi on this date of service. This time includes time spent by me in the following activities: preparing for the visit, reviewing tests, obtaining and/or reviewing a separately obtained history, performing a medically appropriate examination and/or evaluation, counseling and educating the patient/family/caregiver, ordering medications, tests, or procedures, documenting information in the medical record, independently interpreting results and communicating that information with the patient/family/caregiver, and care coordination.                             Electronically Signed by: LEILANI Newman , December 11, 2023 15:53 EST       CC:   Melodie Mejias PA

## 2023-12-12 LAB — VIT B12 BLD-MCNC: >2000 PG/ML (ref 211–946)

## 2024-07-08 ENCOUNTER — LAB (OUTPATIENT)
Dept: ONCOLOGY | Facility: CLINIC | Age: 54
End: 2024-07-08
Payer: COMMERCIAL

## 2024-07-08 ENCOUNTER — OFFICE VISIT (OUTPATIENT)
Dept: ONCOLOGY | Facility: CLINIC | Age: 54
End: 2024-07-08
Payer: COMMERCIAL

## 2024-07-08 VITALS
WEIGHT: 235.2 LBS | RESPIRATION RATE: 18 BRPM | DIASTOLIC BLOOD PRESSURE: 84 MMHG | BODY MASS INDEX: 45.93 KG/M2 | TEMPERATURE: 98 F | OXYGEN SATURATION: 98 % | SYSTOLIC BLOOD PRESSURE: 119 MMHG | HEART RATE: 100 BPM

## 2024-07-08 DIAGNOSIS — R53.83 OTHER FATIGUE: Primary | ICD-10-CM

## 2024-07-08 DIAGNOSIS — R53.83 OTHER FATIGUE: ICD-10-CM

## 2024-07-08 DIAGNOSIS — K90.9 MALABSORPTION OF IRON: ICD-10-CM

## 2024-07-08 DIAGNOSIS — D50.9 IRON DEFICIENCY ANEMIA, UNSPECIFIED IRON DEFICIENCY ANEMIA TYPE: ICD-10-CM

## 2024-07-08 DIAGNOSIS — E53.8 B12 DEFICIENCY: ICD-10-CM

## 2024-07-08 LAB
25(OH)D3 SERPL-MCNC: 16.9 NG/ML (ref 30–100)
BASOPHILS # BLD AUTO: 0.06 10*3/MM3 (ref 0–0.2)
BASOPHILS NFR BLD AUTO: 0.8 % (ref 0–1.5)
DEPRECATED RDW RBC AUTO: 42.7 FL (ref 37–54)
EOSINOPHIL # BLD AUTO: 0.06 10*3/MM3 (ref 0–0.4)
EOSINOPHIL NFR BLD AUTO: 0.8 % (ref 0.3–6.2)
ERYTHROCYTE [DISTWIDTH] IN BLOOD BY AUTOMATED COUNT: 12.9 % (ref 12.3–15.4)
FERRITIN SERPL-MCNC: 108.4 NG/ML (ref 13–150)
FOLATE SERPL-MCNC: 12.2 NG/ML (ref 4.78–24.2)
HCT VFR BLD AUTO: 41.6 % (ref 34–46.6)
HGB BLD-MCNC: 14 G/DL (ref 12–15.9)
IMM GRANULOCYTES # BLD AUTO: 0.07 10*3/MM3 (ref 0–0.05)
IMM GRANULOCYTES NFR BLD AUTO: 0.9 % (ref 0–0.5)
IRON 24H UR-MRATE: 120 MCG/DL (ref 37–145)
IRON SATN MFR SERPL: 36 % (ref 20–50)
LYMPHOCYTES # BLD AUTO: 1.86 10*3/MM3 (ref 0.7–3.1)
LYMPHOCYTES NFR BLD AUTO: 25 % (ref 19.6–45.3)
MCH RBC QN AUTO: 30.7 PG (ref 26.6–33)
MCHC RBC AUTO-ENTMCNC: 33.7 G/DL (ref 31.5–35.7)
MCV RBC AUTO: 91.2 FL (ref 79–97)
MONOCYTES # BLD AUTO: 0.77 10*3/MM3 (ref 0.1–0.9)
MONOCYTES NFR BLD AUTO: 10.4 % (ref 5–12)
NEUTROPHILS NFR BLD AUTO: 4.61 10*3/MM3 (ref 1.7–7)
NEUTROPHILS NFR BLD AUTO: 62.1 % (ref 42.7–76)
NRBC BLD AUTO-RTO: 0 /100 WBC (ref 0–0.2)
PLATELET # BLD AUTO: 324 10*3/MM3 (ref 140–450)
PMV BLD AUTO: 9.7 FL (ref 6–12)
RBC # BLD AUTO: 4.56 10*6/MM3 (ref 3.77–5.28)
TIBC SERPL-MCNC: 334 MCG/DL (ref 298–536)
TRANSFERRIN SERPL-MCNC: 224 MG/DL (ref 200–360)
TSH SERPL DL<=0.05 MIU/L-ACNC: 1.89 UIU/ML (ref 0.27–4.2)
VIT B12 BLD-MCNC: >2000 PG/ML (ref 211–946)
WBC NRBC COR # BLD AUTO: 7.43 10*3/MM3 (ref 3.4–10.8)

## 2024-07-08 PROCEDURE — 82607 VITAMIN B-12: CPT | Performed by: NURSE PRACTITIONER

## 2024-07-08 PROCEDURE — 84443 ASSAY THYROID STIM HORMONE: CPT | Performed by: NURSE PRACTITIONER

## 2024-07-08 PROCEDURE — 84466 ASSAY OF TRANSFERRIN: CPT | Performed by: NURSE PRACTITIONER

## 2024-07-08 PROCEDURE — 99214 OFFICE O/P EST MOD 30 MIN: CPT | Performed by: NURSE PRACTITIONER

## 2024-07-08 PROCEDURE — 82728 ASSAY OF FERRITIN: CPT | Performed by: NURSE PRACTITIONER

## 2024-07-08 PROCEDURE — 82306 VITAMIN D 25 HYDROXY: CPT | Performed by: NURSE PRACTITIONER

## 2024-07-08 PROCEDURE — 82746 ASSAY OF FOLIC ACID SERUM: CPT | Performed by: NURSE PRACTITIONER

## 2024-07-08 PROCEDURE — 85025 COMPLETE CBC W/AUTO DIFF WBC: CPT | Performed by: NURSE PRACTITIONER

## 2024-07-08 PROCEDURE — 83540 ASSAY OF IRON: CPT | Performed by: NURSE PRACTITIONER

## 2024-07-08 RX ORDER — MULTIPLE VITAMINS W/ MINERALS TAB 9MG-400MCG
1 TAB ORAL DAILY
COMMUNITY

## 2024-07-08 NOTE — PROGRESS NOTES
Venipuncture Blood Specimen Collection  Venipuncture performed in left arm by Catalina Quinn MA with good hemostasis. Patient tolerated the procedure well without complications.   07/08/24   Catalina Quinn MA

## 2024-07-08 NOTE — PROGRESS NOTES
DATE:  7/8/2024    REASON FOR FOLLOW UP: NOLAN    REFERRING PHYSICIAN:  ALINE Vora    CHIEF COMPLAINT:  Worsening Fatigue    TREATMENT:  1. Oral iron-unable to tolerate due to significant GI SE    2. IV Feraheme      3. SL B12 replacement-started mid-September 2019    HISTORY OF PRESENT ILLNESS:   Kadi Awad is a very pleasant 53 y.o. female who is being seen today at the request of ALINE Vora for evaluation and treatment of NOLAN. Ms. Awad reports following with her PCP as needed and typically has blood testing done every 6 months to a year. She reports she has been struggling with NOLAN over the past year. Otherwise, she was not aware of this before as she doesn't follow up routinely. She says she feels exhausted all the time. She also has shortness of breath on exertion. She previously tried taking oral iron therapy but she says this causes significant nausea and she is unable to tolerate it. At present, she is not taking any oral iron. She says she did receive one IV iron infusion at the end of July per PCP. She denies having menorrhagia. Denies melena, rectal bleeding or hematuria. She has never had a screening colonoscopy. She denies any other complaints today.     INTERVAL HISTORY:  Ms. Awad presents today for follow up of iron deficiency anemia. She reports today worsening fatigue over the last couple of months. She denies shortness of breath on exertion, chest pain, dizziness. She denies any obvious blood loss from any source. She is no longer having menstrual cycles. She was last replaced with IV Feraheme in March 2023. She is without further specific complaints today.    PAST MEDICAL HISTORY:  Past Medical History:   Diagnosis Date    GERD (gastroesophageal reflux disease)     History of transfusion     Hypertension     Lymphedema     Scoliosis      PAST SURGICAL HISTORY:  Past Surgical History:   Procedure Laterality Date    BREAST BIOPSY Left 5/31/2022    Procedure: BREAST  BIOPSY WITH NEEDLE LOCALIZATION;  Surgeon: Lauren Lucas MD;  Location: [facility];  Service: General;  Laterality: Left;    COLONOSCOPY      ENDOSCOPY      SPINE SURGERY      steel froy for scoliosis     FAMILY HISTORY:  Family History   Problem Relation Age of Onset    Hyperlipidemia Mother     Hyperlipidemia Father     Breast cancer Neg Hx      SOCIAL HISTORY:  Social History     Socioeconomic History    Marital status: Single   Tobacco Use    Smoking status: Never    Smokeless tobacco: Never   Vaping Use    Vaping status: Never Used   Substance and Sexual Activity    Alcohol use: No    Drug use: No    Sexual activity: Defer     MEDICATIONS:  The current medication list was reviewed in the EMR    Current Outpatient Medications:     lisinopril-hydrochlorothiazide (PRINZIDE,ZESTORETIC) 20-12.5 MG per tablet, Take 1 tablet by mouth Daily., Disp: , Rfl:     loratadine (CLARITIN) 10 MG tablet, Take 1 tablet by mouth Daily., Disp: , Rfl:     multivitamin with minerals ([employer]) tablet tablet, Take 1 tablet by mouth Daily., Disp: , Rfl:     pantoprazole (PROTONIX) 40 MG EC tablet, Take 1 tablet by mouth Daily., Disp: , Rfl:     Pyridoxine HCl (B-6 PO), Take  by mouth Daily., Disp: , Rfl:     Turmeric 500 MG capsule, Take 1 capsule by mouth Daily., Disp: , Rfl:     vitamin B-12 (CYANOCOBALAMIN) 100 MCG tablet, Take 0.5 tablets by mouth Daily., Disp: , Rfl:     HYDROcodone-acetaminophen (Norco) 7.5-325 MG per tablet, Take 1 tablet by mouth 4 (Four) Times a Day As Needed for Moderate Pain. (Patient not taking: Reported on 12/11/2023), Disp: 8 tablet, Rfl: 0    ALLERGIES:    Allergies   Allergen Reactions    Minocycline-Acne Care Products Other (See Comments) and Dizziness     headaches    Tetracyclines & Related Unknown - Low Severity     REVIEW OF SYSTEMS:    A comprehensive 14 point review of systems was performed.  Significant findings as mentioned above.  All other systems reviewed and are  negative.      Physical Exam   Vital Signs: /84   Pulse 100   Temp 98 °F (36.7 °C) (Temporal)   Resp 18   Wt 107 kg (235 lb 3.2 oz)   SpO2 98%   BMI 45.93 kg/m²      ECOG score: 0   General: Well developed, well nourished, alert and oriented x 3, in no acute distress.  Head: ATNC   Eyes: PERRL, No evidence of conjunctivitis.   Nose: No nasal discharge.   Mouth: Oral mucosal membranes moist. No oral ulceration or hemorrhages.   Neck: Neck supple. No thyromegaly. No JVD.   Lungs: Clear in all fields to A&P without rales, rhonchi or wheezing.   Heart: S1, S2. No murmurs, rubs, or gallops.   Abdomen: Soft. Bowel sounds are normoactive. Nontender with palpation.    Extremities: No cyanosis or edema.   Integumentary: Warm, dry, intact.  Neurologic: Grossly non-focal exam      Pain Score:  Pain Score    07/08/24 0843   PainSc: 0-No pain       Endoscopy:    Colonoscopy 02-10-20      Pathology:  02-10-20    RECENT LABS:  Workup 9/17/19      Lab Results   Component Value Date    COPPER 132 09/17/2019     Lab Results   Component Value Date    ZINC 59 09/17/2019     Lab Results   Component Value Date    HAPTOGLOBIN 177 09/17/2019     Lab Results   Component Value Date     09/17/2019     Lab Results   Component Value Date    TSH 1.660 09/17/2019     Lab Results   Component Value Date    SEDRATE 22 (H) 09/17/2019       Lab Results   Component Value Date    WBC 7.43 07/08/2024    HGB 14.0 07/08/2024    HCT 41.6 07/08/2024    MCV 91.2 07/08/2024    RDW 12.9 07/08/2024     07/08/2024    NEUTRORELPCT 62.1 07/08/2024    LYMPHORELPCT 25.0 07/08/2024    MONORELPCT 10.4 07/08/2024    EOSRELPCT 0.8 07/08/2024    BASORELPCT 0.8 07/08/2024    NEUTROABS 4.61 07/08/2024    LYMPHSABS 1.86 07/08/2024     Lab Results   Component Value Date     11/13/2022    K 3.2 (L) 11/13/2022    CO2 25.1 11/13/2022    CL 99 11/13/2022    BUN 18 11/13/2022    CREATININE 0.72 11/13/2022    EGFRIFNONA 123 07/29/2021    GLUCOSE 172  (H) 11/13/2022    CALCIUM 10.2 11/13/2022    ALKPHOS 77 11/13/2022    AST 29 11/13/2022    ALT 25 11/13/2022    BILITOT 0.4 11/13/2022    ALBUMIN 4.37 11/13/2022    PROTEINTOT 7.9 11/13/2022    MG 1.9 11/17/2022      Lab Results   Component Value Date    FERRITIN 90.35 12/11/2023    IRON 60 12/11/2023    TIBC 343 12/11/2023    LABIRON 18 (L) 12/11/2023    [id number] >2,000 (H) 12/11/2023    FOLATE 17.10 09/17/2019    HAPTOGLOBIN 177 09/17/2019    RETICCTPCT 1.19 09/17/2019    RETIC 0.0516 09/17/2019     ASSESSMENT & PLAN:  Kadi Awad is a very pleasant 53 y.o. female with    1.  Iron deficiency anemia:  - Ms. Awad typically follows with PCP only as needed with blood testing every 6 months to a year. She reports she has been struggling with NOLAN over the past year. Previous available labs reviewed.   - Unfortunately, she is unable to tolerate oral iron due to significant GI SE.  She denies having melena, rectal bleeding or hematuria. Denies menorrhagia.   - Obtained repeat CBC on initial consultation which showed stable Hg 9.3, platelets were elevated (likely reactive to NOLAN). Repeat iron studies remained very low. Therefore, we have been replacing with IV Feraheme as needed.    - She was referred to GI and had EGD done which was reportedly negative for bleeding. However, she says she has a hiatal hernia and was started on a PPI. Last colonoscopy in 2020 was with four polyps removed that were negative for dysplasia.   - She had hernia repair in late 2022 from which she has recovered without difficulties.  - Also previously checked additional labs to further evaluate anemia including PBS as above. There was no evidence of hemolysis, B12 was low therefore, started replacement as below,  Folate replete, TSH was normal, ESR was mildly elevated and suggestive of underlying inflammation, CRP was normal, Copper and Zinc were normal. Tissue Transglutaminase was normal.   - She was last replaced with IV Feraheme in March  2023.   - CBC from today continues to show normalized Hg (14.0). Iron panel and Ferritin is replete. B12 level pending from today.   - Advised to continue B12 SL daily.  - Will follow up in 6 months with CBC, Iron panel and Ferritin.   - She is planning to arrange appointment with Dr. Gonzalez for screening colonoscopy.     2. B12 deficiency:  - She is taking SL B12 2500 mcg daily as advised. B12 now replete.   - B12 level pending from today. Will monitor.    3. Thrombocytosis:  - Likely reactive and secondary to NOLAN. Platelet count normalized following replacement with IV iron.   - CBC with platelet count 324,000.    4. Worsening Fatigue:  - Discussed with patient that above Hg and Iron levels should not be contributing to worsening fatigue. Obtained TSH which was normal. Also obtained B12, Folate and Vitamin D level to further evaluate symptoms. Will follow up with pending results.       ACO / ANDRE/Other  Quality measures  -  Kadi Awad received 2023 flu vaccine.  -  Kadi Awad reports a pain score of 0.    -  Current outpatient and discharge medications have been reconciled for the patient.  Reviewed by: LEILANI Barboza                    I spent 30 minutes caring for Kadi on this date of service. This time includes time spent by me in the following activities: preparing for the visit, reviewing tests, obtaining and/or reviewing a separately obtained history, performing a medically appropriate examination and/or evaluation, counseling and educating the patient/family/caregiver, ordering medications, tests, or procedures, documenting information in the medical record, independently interpreting results and communicating that information with the patient/family/caregiver, and care coordination.                             Electronically Signed by: LEILANI Newman , July 8, 2024 09:00 EDT       CC:   Melodie Mejias PA

## 2024-07-09 DIAGNOSIS — E55.9 VITAMIN D DEFICIENCY: Primary | ICD-10-CM

## 2024-07-09 RX ORDER — ERGOCALCIFEROL 1.25 MG/1
50000 CAPSULE ORAL WEEKLY
Qty: 5 CAPSULE | Refills: 11 | Status: SHIPPED | OUTPATIENT
Start: 2024-07-09

## 2024-07-09 NOTE — PROGRESS NOTES
Notified patient of low Vitamin D level. RX was provided for Vitamin D 50,000 units weekly to pharmacy on file. Will repeat Vitamin D level at next follow up.      Kristen Bansal, LEILANI  07/09/2024  0810 am

## 2024-11-18 ENCOUNTER — OFFICE VISIT (OUTPATIENT)
Dept: NEUROSURGERY | Facility: CLINIC | Age: 54
End: 2024-11-18
Payer: COMMERCIAL

## 2024-11-18 VITALS
WEIGHT: 237.2 LBS | BODY MASS INDEX: 46.57 KG/M2 | HEIGHT: 60 IN | SYSTOLIC BLOOD PRESSURE: 110 MMHG | DIASTOLIC BLOOD PRESSURE: 62 MMHG

## 2024-11-18 DIAGNOSIS — M41.115 JUVENILE IDIOPATHIC SCOLIOSIS OF THORACOLUMBAR REGION: ICD-10-CM

## 2024-11-18 DIAGNOSIS — M48.062 SPINAL STENOSIS, LUMBAR REGION, WITH NEUROGENIC CLAUDICATION: Primary | ICD-10-CM

## 2024-11-18 DIAGNOSIS — E66.01 MORBID OBESITY WITH BMI OF 45.0-49.9, ADULT: ICD-10-CM

## 2024-11-18 PROCEDURE — 99204 OFFICE O/P NEW MOD 45 MIN: CPT | Performed by: PHYSICIAN ASSISTANT

## 2024-11-18 RX ORDER — BETAMETHASONE DIPROPIONATE 0.5 MG/G
CREAM TOPICAL
COMMUNITY
Start: 2024-07-24

## 2024-11-18 RX ORDER — CETIRIZINE HYDROCHLORIDE 10 MG/1
10 TABLET ORAL
COMMUNITY

## 2024-11-18 NOTE — PROGRESS NOTES
Patient: Kadi Awad  : 1970  Chart #: [id number]    Date of Service: 2024    CC: Low back pain, heaviness in the legs      Back Pain  Pertinent negatives include no abdominal pain, chest pain, dysuria, fever, headaches, numbness, pelvic pain or weakness.     Ms. Awad is a 54-year-old female who is the  at the [employer] in [facility].  She has a history of scoliosis and was braced at the age of 9, she underwent thoracic fusion for correction of her scoliotic curve at the age of 1985 at [facility] in [place].  She was rebraced at the age of 18.  When she turned 20 years of age she was lost to follow-up.  She has had low back pain for years.  Now she randomly will get a feeling of heaviness in her lower back and the feeling of heaviness in her legs that she feels like she is standing in mud.  She can lift her legs and she can walk but the sensation is heaviness.  No focal weakness that she can describe.  She presents with a CT scan of the lumbar spine to review.  She was told after her surgery that she could not have a MRI scan due to her steel froy.    Chronic Illnesses:  Past Medical History:   Diagnosis Date    GERD (gastroesophageal reflux disease)     History of transfusion     Hypertension     Lymphedema     Scoliosis          Past Surgical History:   Procedure Laterality Date    BREAST BIOPSY Left 2022    Procedure: BREAST BIOPSY WITH NEEDLE LOCALIZATION;  Surgeon: Lauren Lucas MD;  Location: [facility];  Service: General;  Laterality: Left;    COLONOSCOPY      ENDOSCOPY      SPINE SURGERY      steel froy for scoliosis       Allergies   Allergen Reactions    Minocycline-Acne Care Products Other (See Comments) and Dizziness     headaches    Tetracyclines & Related Unknown - Low Severity         Current Outpatient Medications:     betamethasone dipropionate (DIPROSONE) 0.05 % cream, APPLY A THIN LAYER TOPICALLY TO AFFECTED  AREA TWICE DAILY, Disp: , Rfl:     cetirizine (zyrTEC) 10 MG tablet, Take 1 tablet by mouth., Disp: , Rfl:     lisinopril-hydrochlorothiazide (PRINZIDE,ZESTORETIC) 20-12.5 MG per tablet, Take 1 tablet by mouth Daily., Disp: , Rfl:     loratadine (CLARITIN) 10 MG tablet, Take 1 tablet by mouth Daily., Disp: , Rfl:     multivitamin with minerals (Hair Skin & Nails Advanced) tablet tablet, Take 1 tablet by mouth Daily., Disp: , Rfl:     pantoprazole (PROTONIX) 40 MG EC tablet, Take 1 tablet by mouth Daily., Disp: , Rfl:     Pyridoxine HCl (B-6 PO), Take  by mouth Daily., Disp: , Rfl:     Turmeric 500 MG capsule, Take 1 capsule by mouth Daily., Disp: , Rfl:     vitamin B-12 (CYANOCOBALAMIN) 100 MCG tablet, Take 0.5 tablets by mouth Daily., Disp: , Rfl:     vitamin D (ERGOCALCIFEROL) 1.25 MG (02175 UT) capsule capsule, Take 1 capsule by mouth 1 (One) Time Per Week., Disp: 5 capsule, Rfl: 11    Social History     Socioeconomic History    Marital status: Single   Tobacco Use    Smoking status: Never    Smokeless tobacco: Never   Vaping Use    Vaping status: Never Used   Substance and Sexual Activity    Alcohol use: No    Drug use: No    Sexual activity: Defer       Family History   Problem Relation Age of Onset    Hyperlipidemia Mother     Hyperlipidemia Father     Breast cancer Neg Hx        Class 3 Severe Obesity (BMI >=40). Obesity-related health conditions include the following: osteoarthritis. Obesity is unchanged. BMI is is above average; BMI management plan is completed.     Social History    Tobacco Use      Smoking status: Never      Smokeless tobacco: Never       Review of Systems   Constitutional:  Positive for activity change. Negative for appetite change, chills, diaphoresis, fatigue, fever and unexpected weight change.   HENT:  Negative for congestion, dental problem, drooling, ear discharge, ear pain, facial swelling, hearing loss, mouth sores, nosebleeds, postnasal drip, rhinorrhea, sinus pressure, sinus  pain, sneezing, sore throat, tinnitus, trouble swallowing and voice change.    Eyes:  Negative for photophobia, pain, discharge, redness, itching and visual disturbance.   Respiratory:  Negative for apnea, cough, choking, chest tightness, shortness of breath, wheezing and stridor.    Cardiovascular:  Negative for chest pain, palpitations and leg swelling.   Gastrointestinal:  Negative for abdominal distention, abdominal pain, anal bleeding, blood in stool, constipation, diarrhea, nausea, rectal pain and vomiting.   Endocrine: Negative for cold intolerance, heat intolerance, polydipsia, polyphagia and polyuria.   Genitourinary:  Negative for decreased urine volume, difficulty urinating, dyspareunia, dysuria, enuresis, flank pain, frequency, genital sores, hematuria, menstrual problem, pelvic pain, urgency, vaginal bleeding, vaginal discharge and vaginal pain.   Musculoskeletal:  Positive for back pain. Negative for arthralgias, gait problem, joint swelling, myalgias, neck pain and neck stiffness.   Skin:  Negative for color change, pallor, rash and wound.   Allergic/Immunologic: Negative for environmental allergies, food allergies and immunocompromised state.   Neurological:  Negative for dizziness, tremors, seizures, syncope, facial asymmetry, speech difficulty, weakness, light-headedness, numbness and headaches.   Hematological:  Negative for adenopathy. Does not bruise/bleed easily.   Psychiatric/Behavioral:  Negative for agitation, behavioral problems, confusion, decreased concentration, dysphoric mood, hallucinations, self-injury, sleep disturbance and suicidal ideas. The patient is not nervous/anxious and is not hyperactive.         Gait & Balance Assessment:  Risk assessment for falls. Fall precautions:  such as;   Using gait aids a cane, walker at the appropriate height at all times for ambulation or if necessary a wheelchair  Removing all area rugs and coffee tables to create a safe environment at  "home  Ensure clean, dry floors  Wearing supportive footwear and properly fitting clothing  Ensure bed/chair is appropriate height and patient's feet can touch the floor  Using a shower transfer bench  Using walk-in shower and having shower safety bars installed  Ensure proper lighting, minimize glare  Have nightlights operational and in use  Participation in an exercise program for gait training, balance training and strength  Avoid carrying laundry up and down steps  Ensure proper compliance and organization of medications to avoid errors   Avoid use of over the counter sedatives and alcohol consumption  Ensure easy access to call bell, glasses, TV control, telephone  Ensure glasses/hearing aids are in use or close by (on top of night table)     Physical examination:  Blood pressure 110/62, height 152.4 cm (60\"), weight 108 kg (237 lb 3.2 oz).  HEENT- normocephalic, atraumatic, sclera clear  Lungs-normal expansion, no wheezing  Heart-regular rate and rhythm  Extremities-positive pulses, no edema    Neurological Exam   [id number]  A/A/C, speech clear, attention normal, conversant, answers questions appropriately, good historian.  Cranial nerves II through XII are intact.  Motor examination does not reveal focal weakness in the lower extremities.   Gait is normal, balance is normal.  Her right iliac crest is higher than her left, her shoulder balance is off as well.  No tremors are noted.  Palpation of the lumbar spine is tender    Radiographic Imaging:  For my review is a CT scan of the lumbar spine which shows a significant curve to the left      Medical Decision Making  Diagnoses and all orders for this visit:    1. Spinal stenosis, lumbar region, with neurogenic claudication (Primary)  -     Ambulatory Referral to Neurosurgery    2. Juvenile idiopathic scoliosis of thoracolumbar region  -     Ambulatory Referral to Neurosurgery    3. Morbid obesity with BMI of 45.0-49.9, adult    Ms. Odell is a 54-year-old female " with juvenile scoliosis who was braced at the age of 9, thoracic fusion at the age of 15 in [facility] and rebraced at the age of 18 due to her lumbar scoliosis.  She was lost to follow-up after she became the age of 20.  She has had chronic low back pain always.  Randomly now she will get heaviness in her legs and increased back pain this most commonly occurs in the shower.  She endorses symptoms of neurogenic claudication, CT scan of the lumbar spine does show a significant scoliotic curve to the left with varying degrees of degenerative disc disease.  There is bulging disc at L4-5.  Given the patient's complicated spinal anatomy I have recommended that she be evaluated at the [facility] by the complex spine reconstructive group.  She is in agreement with this plan.  It was a pleasure providing neurosurgical evaluation and I am happy to make this referral.    Any copied data from previous notes included in the (1) HPI, (2) PE, (3) MDM and/or assessment and plan has been reviewed and is accurate as of this day.    Alysia Savage, RANDOLPH    Patient Care Team:  Melodie Mejias PA as PCP - General (Physician Assistant)  Candace Hatch APRN as Nurse Practitioner (Nurse Practitioner)  Melodie Mejias PA as Referring Physician (Physician Assistant)  Tacho Masters MD as Consulting Physician (Neurosurgery)

## 2024-11-21 NOTE — ANESTHESIA POSTPROCEDURE EVALUATION
Patient: Kadi Awad    Procedure Summary     Date: 05/31/22 Room / Location: [facility] OR 01 /  [facility] OR    Anesthesia Start: 0923 Anesthesia Stop: 1006    Procedures:       BREAST BIOPSY WITH NEEDLE LOCALIZATION (Left Breast)      INTRAOPERATIVE ULTRASOUND (Left ) Diagnosis:       Abnormal mammogram      (Abnormal mammogram [R92.8])    Surgeons: Lauren Lucas MD Provider: Chico Pfeiffer MD    Anesthesia Type: general ASA Status: 2          Anesthesia Type: general    Vitals  Vitals Value Taken Time   /77 05/31/22 1007   Temp 97.7 °F (36.5 °C) 05/31/22 1007   Pulse 103 05/31/22 1007   Resp 20 05/31/22 1007   SpO2 99 % 05/31/22 1007           Post Anesthesia Care and Evaluation    Patient location during evaluation: PHASE II  Patient participation: complete - patient participated  Level of consciousness: awake and alert  Pain score: 0  Pain management: adequate  Airway patency: patent  Anesthetic complications: No anesthetic complications    Cardiovascular status: acceptable  Respiratory status: acceptable  Hydration status: acceptable      
chest

## 2025-02-05 ENCOUNTER — LAB (OUTPATIENT)
Dept: ONCOLOGY | Facility: CLINIC | Age: 55
End: 2025-02-05
Payer: COMMERCIAL

## 2025-02-05 ENCOUNTER — OFFICE VISIT (OUTPATIENT)
Dept: ONCOLOGY | Facility: CLINIC | Age: 55
End: 2025-02-05
Payer: COMMERCIAL

## 2025-02-05 VITALS
DIASTOLIC BLOOD PRESSURE: 72 MMHG | HEART RATE: 80 BPM | TEMPERATURE: 97.7 F | HEIGHT: 61 IN | RESPIRATION RATE: 18 BRPM | BODY MASS INDEX: 44.37 KG/M2 | WEIGHT: 235 LBS | SYSTOLIC BLOOD PRESSURE: 112 MMHG | OXYGEN SATURATION: 97 %

## 2025-02-05 DIAGNOSIS — E55.9 VITAMIN D DEFICIENCY: ICD-10-CM

## 2025-02-05 DIAGNOSIS — D50.9 IRON DEFICIENCY ANEMIA, UNSPECIFIED IRON DEFICIENCY ANEMIA TYPE: ICD-10-CM

## 2025-02-05 DIAGNOSIS — D50.9 IRON DEFICIENCY ANEMIA, UNSPECIFIED IRON DEFICIENCY ANEMIA TYPE: Primary | ICD-10-CM

## 2025-02-05 DIAGNOSIS — E53.8 B12 DEFICIENCY: ICD-10-CM

## 2025-02-05 DIAGNOSIS — K90.9 MALABSORPTION OF IRON: ICD-10-CM

## 2025-02-05 LAB
25(OH)D3 SERPL-MCNC: 47.2 NG/ML (ref 30–100)
BASOPHILS # BLD AUTO: 0.05 10*3/MM3 (ref 0–0.2)
BASOPHILS NFR BLD AUTO: 0.9 % (ref 0–1.5)
DEPRECATED RDW RBC AUTO: 43 FL (ref 37–54)
EOSINOPHIL # BLD AUTO: 0.03 10*3/MM3 (ref 0–0.4)
EOSINOPHIL NFR BLD AUTO: 0.6 % (ref 0.3–6.2)
ERYTHROCYTE [DISTWIDTH] IN BLOOD BY AUTOMATED COUNT: 12.9 % (ref 12.3–15.4)
FERRITIN SERPL-MCNC: 193 NG/ML (ref 13–150)
HCT VFR BLD AUTO: 37 % (ref 34–46.6)
HGB BLD-MCNC: 12.1 G/DL (ref 12–15.9)
IMM GRANULOCYTES # BLD AUTO: 0.03 10*3/MM3 (ref 0–0.05)
IMM GRANULOCYTES NFR BLD AUTO: 0.6 % (ref 0–0.5)
IRON 24H UR-MRATE: 65 MCG/DL (ref 37–145)
IRON SATN MFR SERPL: 20 % (ref 20–50)
LYMPHOCYTES # BLD AUTO: 1.62 10*3/MM3 (ref 0.7–3.1)
LYMPHOCYTES NFR BLD AUTO: 30.2 % (ref 19.6–45.3)
MCH RBC QN AUTO: 30.2 PG (ref 26.6–33)
MCHC RBC AUTO-ENTMCNC: 32.7 G/DL (ref 31.5–35.7)
MCV RBC AUTO: 92.3 FL (ref 79–97)
MONOCYTES # BLD AUTO: 0.47 10*3/MM3 (ref 0.1–0.9)
MONOCYTES NFR BLD AUTO: 8.8 % (ref 5–12)
NEUTROPHILS NFR BLD AUTO: 3.16 10*3/MM3 (ref 1.7–7)
NEUTROPHILS NFR BLD AUTO: 58.9 % (ref 42.7–76)
NRBC BLD AUTO-RTO: 0 /100 WBC (ref 0–0.2)
PLATELET # BLD AUTO: 304 10*3/MM3 (ref 140–450)
PMV BLD AUTO: 9.3 FL (ref 6–12)
RBC # BLD AUTO: 4.01 10*6/MM3 (ref 3.77–5.28)
TIBC SERPL-MCNC: 328 MCG/DL (ref 298–536)
TRANSFERRIN SERPL-MCNC: 220 MG/DL (ref 200–360)
WBC NRBC COR # BLD AUTO: 5.36 10*3/MM3 (ref 3.4–10.8)

## 2025-02-05 PROCEDURE — 83540 ASSAY OF IRON: CPT | Performed by: NURSE PRACTITIONER

## 2025-02-05 PROCEDURE — 82728 ASSAY OF FERRITIN: CPT | Performed by: NURSE PRACTITIONER

## 2025-02-05 PROCEDURE — 99214 OFFICE O/P EST MOD 30 MIN: CPT | Performed by: NURSE PRACTITIONER

## 2025-02-05 PROCEDURE — 84466 ASSAY OF TRANSFERRIN: CPT | Performed by: NURSE PRACTITIONER

## 2025-02-05 PROCEDURE — 82306 VITAMIN D 25 HYDROXY: CPT | Performed by: NURSE PRACTITIONER

## 2025-02-05 PROCEDURE — 85025 COMPLETE CBC W/AUTO DIFF WBC: CPT | Performed by: NURSE PRACTITIONER

## 2025-02-05 NOTE — PROGRESS NOTES
DATE:  2/5/2025    REASON FOR FOLLOW UP: NOLAN    REFERRING PHYSICIAN:  ALINE Vora    CHIEF COMPLAINT:  Worsening Fatigue    TREATMENT:  1. Oral iron-unable to tolerate due to significant GI SE    2. IV Feraheme      3. SL B12 replacement-started mid-September 2019    HISTORY OF PRESENT ILLNESS:   Kadi Awad is a very pleasant 54 y.o. female who is being seen today at the request of ALINE Vora for evaluation and treatment of NOLAN. Ms. Awad reports following with her PCP as needed and typically has blood testing done every 6 months to a year. She reports she has been struggling with NOLAN over the past year. Otherwise, she was not aware of this before as she doesn't follow up routinely. She says she feels exhausted all the time. She also has shortness of breath on exertion. She previously tried taking oral iron therapy but she says this causes significant nausea and she is unable to tolerate it. At present, she is not taking any oral iron. She says she did receive one IV iron infusion at the end of July per PCP. She denies having menorrhagia. Denies melena, rectal bleeding or hematuria. She has never had a screening colonoscopy. She denies any other complaints today.     INTERVAL HISTORY:  Ms. Awad presents today for follow up of iron deficiency anemia. She reports worsening fatigue. She denies shortness of breath on exertion, chest pain, dizziness. She denies any obvious blood loss from any source. She is no longer having menstrual cycles. She was last replaced with IV Feraheme in March 2023. She continues to take SL B12 2500 mcg daily. At her last visit, she was also noted to have low Vitamin D and she was started on Vitamin D replacement weekly. She is without further specific complaints today.    PAST MEDICAL HISTORY:  Past Medical History:   Diagnosis Date    GERD (gastroesophageal reflux disease)     History of transfusion     Hypertension     Lymphedema     Scoliosis      PAST  SURGICAL HISTORY:  Past Surgical History:   Procedure Laterality Date    BREAST BIOPSY Left 5/31/2022    Procedure: BREAST BIOPSY WITH NEEDLE LOCALIZATION;  Surgeon: Lauren Lucas MD;  Location: [facility];  Service: General;  Laterality: Left;    COLONOSCOPY      ENDOSCOPY      SPINE SURGERY      steel froy for scoliosis     FAMILY HISTORY:  Family History   Problem Relation Age of Onset    Hyperlipidemia Mother     Hyperlipidemia Father     Breast cancer Neg Hx      SOCIAL HISTORY:  Social History     Socioeconomic History    Marital status: Single   Tobacco Use    Smoking status: Never    Smokeless tobacco: Never   Vaping Use    Vaping status: Never Used   Substance and Sexual Activity    Alcohol use: No    Drug use: No    Sexual activity: Defer     MEDICATIONS:  The current medication list was reviewed in the EMR    Current Outpatient Medications:     betamethasone dipropionate (DIPROSONE) 0.05 % cream, APPLY A THIN LAYER TOPICALLY TO AFFECTED AREA TWICE DAILY, Disp: , Rfl:     cetirizine (zyrTEC) 10 MG tablet, Take 1 tablet by mouth., Disp: , Rfl:     lisinopril-hydrochlorothiazide (PRINZIDE,ZESTORETIC) 20-12.5 MG per tablet, Take 1 tablet by mouth Daily., Disp: , Rfl:     loratadine (CLARITIN) 10 MG tablet, Take 1 tablet by mouth Daily., Disp: , Rfl:     pantoprazole (PROTONIX) 40 MG EC tablet, Take 1 tablet by mouth Daily., Disp: , Rfl:     Pyridoxine HCl (B-6 PO), Take  by mouth Daily., Disp: , Rfl:     Turmeric 500 MG capsule, Take 1 capsule by mouth Daily., Disp: , Rfl:     vitamin B-12 (CYANOCOBALAMIN) 100 MCG tablet, Take 0.5 tablets by mouth Daily., Disp: , Rfl:     vitamin D (ERGOCALCIFEROL) 1.25 MG (59126 UT) capsule capsule, Take 1 capsule by mouth 1 (One) Time Per Week., Disp: 5 capsule, Rfl: 11    multivitamin with minerals (Hair Skin & Nails Advanced) tablet tablet, Take 1 tablet by mouth Daily. (Patient not taking: Reported on 2/5/2025), Disp: , Rfl:     ALLERGIES:    Allergies   Allergen  "Reactions    Minocycline-Acne Care Products Other (See Comments) and Dizziness     headaches    Tetracyclines & Related Unknown - Low Severity     REVIEW OF SYSTEMS:    A comprehensive 14 point review of systems was performed.  Significant findings as mentioned above.  All other systems reviewed and are negative.      Physical Exam   Vital Signs: /72   Pulse 80   Temp 97.7 °F (36.5 °C) (Temporal)   Resp 18   Ht 154.9 cm (61\")   Wt 107 kg (235 lb)   LMP  (LMP Unknown)   SpO2 97%   BMI 44.40 kg/m²      ECOG score: 0   General: Well developed, well nourished, alert and oriented x 3, in no acute distress.  Head: ATNC   Eyes: PERRL, No evidence of conjunctivitis.   Nose: No nasal discharge.   Mouth: Oral mucosal membranes moist. No oral ulceration or hemorrhages.   Neck: Neck supple. No thyromegaly. No JVD.   Lungs: Clear in all fields to A&P without rales, rhonchi or wheezing.   Heart: S1, S2. No murmurs, rubs, or gallops.   Abdomen: Soft. Bowel sounds are normoactive. Nontender with palpation.    Extremities: No cyanosis or edema.   Integumentary: Warm, dry, intact.  Neurologic: Grossly non-focal exam      Pain Score:  Pain Score    02/05/25 0844   PainSc: 0-No pain       Endoscopy:    Colonoscopy 02-10-20      Pathology:  02-10-20    RECENT LABS:  Workup 9/17/19      Lab Results   Component Value Date    COPPER 132 09/17/2019     Lab Results   Component Value Date    ZINC 59 09/17/2019     Lab Results   Component Value Date    HAPTOGLOBIN 177 09/17/2019     Lab Results   Component Value Date     09/17/2019     Lab Results   Component Value Date    TSH 1.660 09/17/2019     Lab Results   Component Value Date    SEDRATE 22 (H) 09/17/2019       Lab Results   Component Value Date    WBC 5.36 02/05/2025    HGB 12.1 02/05/2025    HCT 37.0 02/05/2025    MCV 92.3 02/05/2025    RDW 12.9 02/05/2025     02/05/2025    NEUTRORELPCT 58.9 02/05/2025    LYMPHORELPCT 30.2 02/05/2025    MONORELPCT 8.8 " 02/05/2025    EOSRELPCT 0.6 02/05/2025    BASORELPCT 0.9 02/05/2025    NEUTROABS 3.16 02/05/2025    LYMPHSABS 1.62 02/05/2025     Lab Results   Component Value Date     11/13/2022    K 3.2 (L) 11/13/2022    CO2 25.1 11/13/2022    CL 99 11/13/2022    BUN 18 11/13/2022    CREATININE 0.72 11/13/2022    EGFRIFNONA 123 07/29/2021    GLUCOSE 172 (H) 11/13/2022    CALCIUM 10.2 11/13/2022    ALKPHOS 77 11/13/2022    AST 29 11/13/2022    ALT 25 11/13/2022    BILITOT 0.4 11/13/2022    ALBUMIN 4.37 11/13/2022    PROTEINTOT 7.9 11/13/2022    MG 1.9 11/17/2022      Lab Results   Component Value Date    FERRITIN 108.40 07/08/2024    IRON 120 07/08/2024    TIBC 334 07/08/2024    LABIRON 36 07/08/2024    UGKREDNI70 >2,000 (H) 07/08/2024    FOLATE 12.20 07/08/2024    HAPTOGLOBIN 177 09/17/2019    RETICCTPCT 1.19 09/17/2019    RETIC 0.0516 09/17/2019     ASSESSMENT & PLAN:  Kadi Awad is a very pleasant 54 y.o. female with    1.  Iron deficiency anemia:  - Ms. Awad typically follows with PCP only as needed with blood testing every 6 months to a year. She reports she has been struggling with NOLAN over the past year. Previous available labs reviewed.   - Unfortunately, she is unable to tolerate oral iron due to significant GI SE.  She denies having melena, rectal bleeding or hematuria. Denies menorrhagia.   - Obtained repeat CBC on initial consultation which showed stable Hg 9.3, platelets were elevated (likely reactive to NOLAN). Repeat iron studies remained very low. Therefore, we have been replacing with IV Feraheme as needed.    - She was referred to GI and had EGD done which was reportedly negative for bleeding. However, she says she has a hiatal hernia and was started on a PPI. Last colonoscopy in 2020 was with four polyps removed that were negative for dysplasia.   - She had hernia repair in late 2022 from which she has recovered without difficulties.  - Also previously checked additional labs to further evaluate anemia  including PBS as above. There was no evidence of hemolysis, B12 was low therefore, started replacement as below,  Folate replete, TSH was normal, ESR was mildly elevated and suggestive of underlying inflammation, CRP was normal, Copper and Zinc were normal. Tissue Transglutaminase was normal.   - She was last replaced with IV Feraheme in March 2023.   - CBC from today continues to show normalized Hg (12.1). Iron panel and Ferritin is replete.   - Advised to continue B12 SL daily.  - Will follow up in 6 months with CBC, Iron panel and Ferritin.   - She is planning to arrange appointment with Dr. Gonzalez for screening colonoscopy.     2. B12 deficiency:  - She is taking SL B12 2500 mcg daily as advised. B12 now replete.   - Will monitor.    3. Thrombocytosis:  - Likely reactive and secondary to NOLAN. Platelet count normalized following replacement with IV iron.   - CBC with platelet count 304,000.    4. Worsening Fatigue:  - Discussed with patient that above Hg and Iron levels should not be contributing to worsening fatigue. Obtained TSH which was normal. B12 and Folate are replete. Vitamin D was low and she was started on replacement (Vitamin D 50,000 units weekly). She continues to report significant fatigue today. Repeat Vitamin D level is pending from today. Will follow up with pending results.         ACO / ANDRE/Other  Quality measures  -  Kadi Awad received 2024 flu vaccine.  -  Kadi Awad reports a pain score of 0.   -  Current outpatient and discharge medications have been reconciled for the patient.  Reviewed by: LEILANI Barboza                  I spent 30 minutes caring for Kadi on this date of service. This time includes time spent by me in the following activities: preparing for the visit, reviewing tests, obtaining and/or reviewing a separately obtained history, performing a medically appropriate examination and/or evaluation, counseling and educating the patient/family/caregiver, ordering  medications, tests, or procedures, documenting information in the medical record, independently interpreting results and communicating that information with the patient/family/caregiver, and care coordination.                             Electronically Signed by: LEILANI Newman , February 5, 2025 08:57 EST       CC:   Melodie Mejias PA

## 2025-02-05 NOTE — PROGRESS NOTES
Venipuncture Blood Specimen Collection  Venipuncture performed in left arm by Anastasiia Feliciano MA with good hemostasis. Patient tolerated the procedure well without complications.   02/05/25   Anastasiia Feliciano MA

## 2025-07-21 RX ORDER — ERGOCALCIFEROL 1.25 MG/1
50000 CAPSULE, LIQUID FILLED ORAL WEEKLY
Qty: 5 CAPSULE | Refills: 0 | Status: SHIPPED | OUTPATIENT
Start: 2025-07-21

## 2025-08-11 ENCOUNTER — OFFICE VISIT (OUTPATIENT)
Dept: ONCOLOGY | Facility: CLINIC | Age: 55
End: 2025-08-11
Payer: COMMERCIAL

## 2025-08-11 ENCOUNTER — LAB (OUTPATIENT)
Dept: ONCOLOGY | Facility: CLINIC | Age: 55
End: 2025-08-11
Payer: COMMERCIAL

## 2025-08-11 VITALS
TEMPERATURE: 98 F | OXYGEN SATURATION: 98 % | BODY MASS INDEX: 43.33 KG/M2 | HEART RATE: 118 BPM | RESPIRATION RATE: 18 BRPM | DIASTOLIC BLOOD PRESSURE: 85 MMHG | SYSTOLIC BLOOD PRESSURE: 121 MMHG | WEIGHT: 229.5 LBS | HEIGHT: 61 IN

## 2025-08-11 DIAGNOSIS — E53.8 B12 DEFICIENCY: ICD-10-CM

## 2025-08-11 DIAGNOSIS — K90.9 MALABSORPTION OF IRON: ICD-10-CM

## 2025-08-11 DIAGNOSIS — D50.9 IRON DEFICIENCY ANEMIA, UNSPECIFIED IRON DEFICIENCY ANEMIA TYPE: ICD-10-CM

## 2025-08-11 DIAGNOSIS — D50.9 IRON DEFICIENCY ANEMIA, UNSPECIFIED IRON DEFICIENCY ANEMIA TYPE: Primary | ICD-10-CM

## 2025-08-11 DIAGNOSIS — E55.9 VITAMIN D DEFICIENCY: ICD-10-CM

## 2025-08-11 LAB
25(OH)D3 SERPL-MCNC: 59.4 NG/ML (ref 30–100)
BASOPHILS # BLD AUTO: 0.06 10*3/MM3 (ref 0–0.2)
BASOPHILS NFR BLD AUTO: 0.8 % (ref 0–1.5)
DEPRECATED RDW RBC AUTO: 43.5 FL (ref 37–54)
EOSINOPHIL # BLD AUTO: 0.06 10*3/MM3 (ref 0–0.4)
EOSINOPHIL NFR BLD AUTO: 0.8 % (ref 0.3–6.2)
ERYTHROCYTE [DISTWIDTH] IN BLOOD BY AUTOMATED COUNT: 13.2 % (ref 12.3–15.4)
FERRITIN SERPL-MCNC: 114 NG/ML (ref 13–150)
HCT VFR BLD AUTO: 41.1 % (ref 34–46.6)
HGB BLD-MCNC: 13.7 G/DL (ref 12–15.9)
IMM GRANULOCYTES # BLD AUTO: 0.04 10*3/MM3 (ref 0–0.05)
IMM GRANULOCYTES NFR BLD AUTO: 0.6 % (ref 0–0.5)
IRON 24H UR-MRATE: 84 MCG/DL (ref 37–145)
IRON SATN MFR SERPL: 26 % (ref 20–50)
LYMPHOCYTES # BLD AUTO: 2.03 10*3/MM3 (ref 0.7–3.1)
LYMPHOCYTES NFR BLD AUTO: 28.2 % (ref 19.6–45.3)
MCH RBC QN AUTO: 30 PG (ref 26.6–33)
MCHC RBC AUTO-ENTMCNC: 33.3 G/DL (ref 31.5–35.7)
MCV RBC AUTO: 89.9 FL (ref 79–97)
MONOCYTES # BLD AUTO: 0.7 10*3/MM3 (ref 0.1–0.9)
MONOCYTES NFR BLD AUTO: 9.7 % (ref 5–12)
NEUTROPHILS NFR BLD AUTO: 4.3 10*3/MM3 (ref 1.7–7)
NEUTROPHILS NFR BLD AUTO: 59.9 % (ref 42.7–76)
NRBC BLD AUTO-RTO: 0 /100 WBC (ref 0–0.2)
PLATELET # BLD AUTO: 334 10*3/MM3 (ref 140–450)
PMV BLD AUTO: 10.2 FL (ref 6–12)
RBC # BLD AUTO: 4.57 10*6/MM3 (ref 3.77–5.28)
TIBC SERPL-MCNC: 320 MCG/DL (ref 298–536)
TRANSFERRIN SERPL-MCNC: 215 MG/DL (ref 200–360)
VIT B12 BLD-MCNC: >2000 PG/ML (ref 211–946)
WBC NRBC COR # BLD AUTO: 7.19 10*3/MM3 (ref 3.4–10.8)

## 2025-08-11 PROCEDURE — 99214 OFFICE O/P EST MOD 30 MIN: CPT | Performed by: NURSE PRACTITIONER

## 2025-08-11 PROCEDURE — 82306 VITAMIN D 25 HYDROXY: CPT | Performed by: NURSE PRACTITIONER

## 2025-08-11 PROCEDURE — 82728 ASSAY OF FERRITIN: CPT | Performed by: NURSE PRACTITIONER

## 2025-08-11 PROCEDURE — 82607 VITAMIN B-12: CPT | Performed by: NURSE PRACTITIONER

## 2025-08-11 PROCEDURE — 36415 COLL VENOUS BLD VENIPUNCTURE: CPT | Performed by: NURSE PRACTITIONER

## 2025-08-11 PROCEDURE — 83540 ASSAY OF IRON: CPT | Performed by: NURSE PRACTITIONER

## 2025-08-11 PROCEDURE — 85025 COMPLETE CBC W/AUTO DIFF WBC: CPT | Performed by: NURSE PRACTITIONER

## 2025-08-11 PROCEDURE — 84466 ASSAY OF TRANSFERRIN: CPT | Performed by: NURSE PRACTITIONER

## 2025-08-11 RX ORDER — ERGOCALCIFEROL 1.25 MG/1
50000 CAPSULE, LIQUID FILLED ORAL WEEKLY
Qty: 5 CAPSULE | Refills: 5 | Status: SHIPPED | OUTPATIENT
Start: 2025-08-11

## (undated) DEVICE — DRAPE,UTILTY,TAPE,15X26, 4EA/PK: Brand: MEDLINE

## (undated) DEVICE — PK BASIC 70

## (undated) DEVICE — UNDYED BRAIDED (POLYGLACTIN 910), SYNTHETIC ABSORBABLE SUTURE: Brand: COATED VICRYL

## (undated) DEVICE — ELECTRD NDL MEGADYNE EZCLEAN NOSE 7CM

## (undated) DEVICE — PATIENT RETURN ELECTRODE, SINGLE-USE, CONTACT QUALITY MONITORING, ADULT, WITH 9FT CORD, FOR PATIENTS WEIGING OVER 33LBS. (15KG): Brand: MEGADYNE

## (undated) DEVICE — GLV SURG PREMIERPRO MIC LTX PF SZ8 BRN

## (undated) DEVICE — ELECTRD BLD EZ CLN MOD 4IN

## (undated) DEVICE — KT INK TISS MARGINMARKER STD 6COLOR

## (undated) DEVICE — AMD ANTIMICROBIAL NON-ADHERENT ISLAND DRESSING,0.2% POLYHEXAMETHYLENE BIGUANIDE HCI (PHMB): Brand: TELFA

## (undated) DEVICE — SUT MNCRYL PLS ANTIB UD 4/0 PS2 18IN

## (undated) DEVICE — TBG PENCL TELESCP MEGADYNE SMOKE EVAC 10FT

## (undated) DEVICE — SHEET,DRAPE,53X77,STERILE: Brand: MEDLINE

## (undated) DEVICE — DEV TRNSPEC

## (undated) DEVICE — SPNG LAP PREWSH SFTPK 18X18IN STRL PK/5

## (undated) DEVICE — SUT VIC 3/0 SH 27IN J416H

## (undated) DEVICE — HOLDER: Brand: DEROYAL

## (undated) DEVICE — GLV SURG SENSICARE W/ALOE PF LF 7.5 STRL

## (undated) DEVICE — KT CVR ULTRASND PROB PULL UP LXF 5X8

## (undated) DEVICE — SUT SILK 2/0 SH 30IN K833H

## (undated) DEVICE — DRAPE,T,LAPARO,TRANS,STERILE: Brand: MEDLINE

## (undated) DEVICE — VIOLET BRAIDED (POLYGLACTIN 910), SYNTHETIC ABSORBABLE SUTURE: Brand: COATED VICRYL

## (undated) DEVICE — APPL CHLORAPREP HI/LITE 26ML ORNG